# Patient Record
Sex: MALE | Race: BLACK OR AFRICAN AMERICAN | NOT HISPANIC OR LATINO | URBAN - METROPOLITAN AREA
[De-identification: names, ages, dates, MRNs, and addresses within clinical notes are randomized per-mention and may not be internally consistent; named-entity substitution may affect disease eponyms.]

---

## 2019-02-16 ENCOUNTER — INPATIENT (INPATIENT)
Facility: HOSPITAL | Age: 39
LOS: 5 days | Discharge: HOME | End: 2019-02-22
Attending: INTERNAL MEDICINE | Admitting: INTERNAL MEDICINE
Payer: MEDICARE

## 2019-02-16 VITALS
HEART RATE: 109 BPM | RESPIRATION RATE: 20 BRPM | TEMPERATURE: 99 F | SYSTOLIC BLOOD PRESSURE: 162 MMHG | DIASTOLIC BLOOD PRESSURE: 97 MMHG | OXYGEN SATURATION: 93 %

## 2019-02-16 LAB
ABO RH CONFIRMATION: SIGNIFICANT CHANGE UP
ALBUMIN SERPL ELPH-MCNC: 3.9 G/DL — SIGNIFICANT CHANGE UP (ref 3.5–5.2)
ALP SERPL-CCNC: 125 U/L — HIGH (ref 30–115)
ALT FLD-CCNC: 37 U/L — SIGNIFICANT CHANGE UP (ref 0–41)
ANION GAP SERPL CALC-SCNC: 12 MMOL/L — SIGNIFICANT CHANGE UP (ref 7–14)
APAP SERPL-MCNC: <5 UG/ML — LOW (ref 10–30)
APPEARANCE UR: CLEAR — SIGNIFICANT CHANGE UP
APTT BLD: 27.5 SEC — SIGNIFICANT CHANGE UP (ref 27–39.2)
AST SERPL-CCNC: 93 U/L — HIGH (ref 0–41)
BACTERIA # UR AUTO: ABNORMAL /HPF
BASOPHILS # BLD AUTO: 0.11 K/UL — SIGNIFICANT CHANGE UP (ref 0–0.2)
BASOPHILS NFR BLD AUTO: 0.9 % — SIGNIFICANT CHANGE UP (ref 0–1)
BILIRUB DIRECT SERPL-MCNC: 0.5 MG/DL — HIGH (ref 0–0.2)
BILIRUB INDIRECT FLD-MCNC: 1.3 MG/DL — HIGH (ref 0.2–1.2)
BILIRUB SERPL-MCNC: 1.8 MG/DL — HIGH (ref 0.2–1.2)
BILIRUB UR-MCNC: ABNORMAL
BLD GP AB SCN SERPL QL: ABNORMAL
BUN SERPL-MCNC: 33 MG/DL — HIGH (ref 10–20)
CALCIUM SERPL-MCNC: 8.7 MG/DL — SIGNIFICANT CHANGE UP (ref 8.5–10.1)
CHLORIDE SERPL-SCNC: 106 MMOL/L — SIGNIFICANT CHANGE UP (ref 98–110)
CO2 SERPL-SCNC: 19 MMOL/L — SIGNIFICANT CHANGE UP (ref 17–32)
COLOR SPEC: YELLOW — SIGNIFICANT CHANGE UP
CREAT SERPL-MCNC: 3.4 MG/DL — HIGH (ref 0.7–1.5)
DIFF PNL FLD: ABNORMAL
EOSINOPHIL # BLD AUTO: 0.55 K/UL — SIGNIFICANT CHANGE UP (ref 0–0.7)
EOSINOPHIL NFR BLD AUTO: 4.4 % — SIGNIFICANT CHANGE UP (ref 0–8)
EPI CELLS # UR: ABNORMAL /HPF
GLUCOSE SERPL-MCNC: 83 MG/DL — SIGNIFICANT CHANGE UP (ref 70–99)
GLUCOSE UR QL: NEGATIVE MG/DL — SIGNIFICANT CHANGE UP
HCT VFR BLD CALC: 15 % — LOW (ref 42–52)
HGB BLD-MCNC: 5.5 G/DL — CRITICAL LOW (ref 14–18)
INR BLD: 1.18 RATIO — SIGNIFICANT CHANGE UP (ref 0.65–1.3)
KETONES UR-MCNC: NEGATIVE — SIGNIFICANT CHANGE UP
LEUKOCYTE ESTERASE UR-ACNC: NEGATIVE — SIGNIFICANT CHANGE UP
LYMPHOCYTES # BLD AUTO: 3.95 K/UL — HIGH (ref 1.2–3.4)
LYMPHOCYTES # BLD AUTO: 31.3 % — SIGNIFICANT CHANGE UP (ref 20.5–51.1)
MCHC RBC-ENTMCNC: 30.7 PG — SIGNIFICANT CHANGE UP (ref 27–31)
MCHC RBC-ENTMCNC: 36.7 G/DL — SIGNIFICANT CHANGE UP (ref 32–37)
MCV RBC AUTO: 83.8 FL — SIGNIFICANT CHANGE UP (ref 80–94)
MONOCYTES # BLD AUTO: 0.98 K/UL — HIGH (ref 0.1–0.6)
MONOCYTES NFR BLD AUTO: 7.8 % — SIGNIFICANT CHANGE UP (ref 1.7–9.3)
MYELOCYTES NFR BLD: 1.7 % — HIGH (ref 0–0)
NEUTROPHILS # BLD AUTO: 6.68 K/UL — HIGH (ref 1.4–6.5)
NEUTROPHILS NFR BLD AUTO: 53 % — SIGNIFICANT CHANGE UP (ref 42.2–75.2)
NITRITE UR-MCNC: NEGATIVE — SIGNIFICANT CHANGE UP
NRBC # BLD: 14 /100 — HIGH (ref 0–0)
NRBC # BLD: SIGNIFICANT CHANGE UP /100 WBCS (ref 0–0)
PH UR: 6.5 — SIGNIFICANT CHANGE UP (ref 5–8)
PLAT MORPH BLD: NORMAL — SIGNIFICANT CHANGE UP
PLATELET # BLD AUTO: 297 K/UL — SIGNIFICANT CHANGE UP (ref 130–400)
POTASSIUM SERPL-MCNC: 5.2 MMOL/L — HIGH (ref 3.5–5)
POTASSIUM SERPL-SCNC: 5.2 MMOL/L — HIGH (ref 3.5–5)
PROT SERPL-MCNC: 7.9 G/DL — SIGNIFICANT CHANGE UP (ref 6–8)
PROT UR-MCNC: >=300 MG/DL
PROTHROM AB SERPL-ACNC: 13.6 SEC — HIGH (ref 9.95–12.87)
RBC # BLD: 1.67 M/UL — LOW (ref 4.7–6.1)
RBC # BLD: 1.79 M/UL — LOW (ref 4.7–6.1)
RBC # FLD: 23.3 % — HIGH (ref 11.5–14.5)
RBC BLD AUTO: ABNORMAL
RBC CASTS # UR COMP ASSIST: ABNORMAL /HPF
RETICS #: 75 K/UL — SIGNIFICANT CHANGE UP (ref 25–125)
RETICS/RBC NFR: 4.5 % — HIGH (ref 0.5–1.5)
SALICYLATES SERPL-MCNC: <0.3 MG/DL — LOW (ref 4–30)
SODIUM SERPL-SCNC: 137 MMOL/L — SIGNIFICANT CHANGE UP (ref 135–146)
SP GR SPEC: 1.01 — SIGNIFICANT CHANGE UP (ref 1.01–1.03)
TYPE + AB SCN PNL BLD: SIGNIFICANT CHANGE UP
UROBILINOGEN FLD QL: 1 MG/DL (ref 0.2–0.2)
VARIANT LYMPHS # BLD: 0.9 % — SIGNIFICANT CHANGE UP (ref 0–5)
WBC # BLD: 12.61 K/UL — HIGH (ref 4.8–10.8)
WBC # FLD AUTO: 12.61 K/UL — HIGH (ref 4.8–10.8)

## 2019-02-16 RX ORDER — SODIUM CHLORIDE 9 MG/ML
3 INJECTION INTRAMUSCULAR; INTRAVENOUS; SUBCUTANEOUS ONCE
Qty: 0 | Refills: 0 | Status: COMPLETED | OUTPATIENT
Start: 2019-02-16 | End: 2019-02-16

## 2019-02-16 RX ORDER — SODIUM CHLORIDE 9 MG/ML
1000 INJECTION, SOLUTION INTRAVENOUS
Qty: 0 | Refills: 0 | Status: DISCONTINUED | OUTPATIENT
Start: 2019-02-16 | End: 2019-02-17

## 2019-02-16 RX ORDER — ONDANSETRON 8 MG/1
4 TABLET, FILM COATED ORAL ONCE
Qty: 0 | Refills: 0 | Status: COMPLETED | OUTPATIENT
Start: 2019-02-16 | End: 2019-02-16

## 2019-02-16 RX ADMIN — ONDANSETRON 4 MILLIGRAM(S): 8 TABLET, FILM COATED ORAL at 21:35

## 2019-02-16 RX ADMIN — SODIUM CHLORIDE 3 MILLILITER(S): 9 INJECTION INTRAMUSCULAR; INTRAVENOUS; SUBCUTANEOUS at 21:35

## 2019-02-16 RX ADMIN — SODIUM CHLORIDE 100 MILLILITER(S): 9 INJECTION, SOLUTION INTRAVENOUS at 21:35

## 2019-02-16 NOTE — ED PROVIDER NOTE - ATTENDING CONTRIBUTION TO CARE
39 yo m with pmh of sickle cell disease, on hydroxyurea only, says baseline hgb of 7, presents with c/o nausea and "parasites" on skin and stool.  pt says he had been feeling very itchy all over for 2 weeks.  pt has been undomiciled for a few months.  pt downloaded a "magnifier saige" on his phone and slathered vaseline on his skin to "suffocate" the parasites and took a video.  pt then had a bm last night and decided to also take a video of the toilet.  pt says he sees "mealworms".  pt feels fatigued and just feels "off".  exam: nad, ncat, perrl, eomi, mmm, rrr, ctab, abd soft, nt,nd aox3, +pallor, tattoos on skin, unable to appreciate any insects/parasites

## 2019-02-16 NOTE — ED ADULT TRIAGE NOTE - CHIEF COMPLAINT QUOTE
Pt states "I have a parasite and they are crawling out of me." Pt states pt has had +nausea, +bowel movements with 'parasites' for 2 weeks. Pt denies any abdominal pain or fever.

## 2019-02-16 NOTE — ED PROVIDER NOTE - PHYSICAL EXAMINATION
VITAL SIGNS: I have reviewed nursing notes and confirm.  CONSTITUTIONAL: Well-developed; well-nourished; in no acute distress.  SKIN: Skin exam is warm and dry, <2 sec cap refill  HEAD: Normocephalic; atraumatic.  EYES: PERRL, EOM intact; normal conjunctiva.  ENT: MMM; airway clear.   NECK: Supple; non tender.  CARD: RRR, 2+ dp pulses  RESP: No wheezes, rales or rhonchi, speaking in full sentences  ABD: soft non tender.   EXT: Normal ROM. No edema.  NEURO: Alert, oriented. Grossly unremarkable. No focal deficits.  PSYCH: Cooperative, appropriate. flat affect VITAL SIGNS: I have reviewed nursing notes and confirm.  CONSTITUTIONAL: Well-developed; well-nourished; in no acute distress.  SKIN: Skin exam is warm and dry, <2 sec cap refill, no skin lesions, unable to appreciate any paracites at this time  HEAD: Normocephalic; atraumatic.  EYES: PERRL, EOM intact; +pale conjunctiva.  ENT: MMM; airway clear.   NECK: Supple; non tender.  CARD: RRR, 2+ dp pulses  RESP: No wheezes, rales or rhonchi, speaking in full sentences  ABD: soft non tender.   EXT: Normal ROM. No edema.  NEURO: Alert, oriented. Grossly unremarkable. No focal deficits.  PSYCH: Cooperative, appropriate. flat affect

## 2019-02-16 NOTE — ED ADULT NURSE NOTE - NSIMPLEMENTINTERV_GEN_ALL_ED
Implemented All Universal Safety Interventions:  [place] to call system. Call bell, personal items and telephone within reach. Instruct patient to call for assistance. Room bathroom lighting operational. Non-slip footwear when patient is off stretcher. Physically safe environment: no spills, clutter or unnecessary equipment. Stretcher in lowest position, wheels locked, appropriate side rails in place.

## 2019-02-16 NOTE — ED PROVIDER NOTE - OBJECTIVE STATEMENT
37 y/o M, h/o sickle cell disease-hydroxyurea only (last crisis was 2 weeks ago, last transfusion was over a year ago-baseline hgb of 7), currently undomicilied for the pat 3-4m, presents with nausea, vomiting, and "parasites crawling over skin and in bowel movements" for the past 2 weeks. pt states the paracites are microscopic and can only be seen with a magnifyign glass. he noticed yesterday after he downloaded a magnifying saige and applying vaseline to his skin. pt staets he last vomited last night. no known relieving or exacerbating symptoms. denies BRBPR, melena, cp, abd pain, sob, urinary symptoms.

## 2019-02-16 NOTE — ED PROVIDER NOTE - CLINICAL SUMMARY MEDICAL DECISION MAKING FREE TEXT BOX
Pt with SCD with c/o parasites, found to be pale, anemic lower than his baseline of 7, also found to have elevated cr, will admit for matheus and anemia, will start transfusion

## 2019-02-16 NOTE — ED PROVIDER NOTE - NS ED ROS FT
Review of Systems:  CONSTITUTIONAL: no fever  EYES: no photophobia, no blurred vision  ENT: no ear pain, no nasal discharge  RESPIRATORY: no shortness of breath, no cough  CARDIAC: no chest pain, no palpitations  GI: no abd pain, + nausea,   : no dysuria; no hematuria,   MUSCULOSKELETAL: no joint paint  NEUROLOGIC: no headache,   PSYCH: no anxiety, non suicidal, non homicidal, no hallucination, no depression

## 2019-02-16 NOTE — ED PROVIDER NOTE - PROGRESS NOTE DETAILS
received a call from [facility]. pt has a positive screen. will take about 2 horus to get blood no prior bloodwork or notes in chart. will get ultrasound to eval KAMRAN

## 2019-02-17 LAB
ALBUMIN SERPL ELPH-MCNC: 3.6 G/DL — SIGNIFICANT CHANGE UP (ref 3.5–5.2)
ALP SERPL-CCNC: 141 U/L — HIGH (ref 30–115)
ALT FLD-CCNC: 42 U/L — HIGH (ref 0–41)
ANION GAP SERPL CALC-SCNC: 11 MMOL/L — SIGNIFICANT CHANGE UP (ref 7–14)
ANISOCYTOSIS BLD QL: SIGNIFICANT CHANGE UP
AST SERPL-CCNC: 104 U/L — HIGH (ref 0–41)
BASOPHILS # BLD AUTO: 0.08 K/UL — SIGNIFICANT CHANGE UP (ref 0–0.2)
BASOPHILS NFR BLD AUTO: 0.7 % — SIGNIFICANT CHANGE UP (ref 0–1)
BILIRUB SERPL-MCNC: 1.8 MG/DL — HIGH (ref 0.2–1.2)
BUN SERPL-MCNC: 34 MG/DL — HIGH (ref 10–20)
CALCIUM SERPL-MCNC: 8.7 MG/DL — SIGNIFICANT CHANGE UP (ref 8.5–10.1)
CHLORIDE SERPL-SCNC: 107 MMOL/L — SIGNIFICANT CHANGE UP (ref 98–110)
CHLORIDE UR-SCNC: 44 — SIGNIFICANT CHANGE UP
CO2 SERPL-SCNC: 19 MMOL/L — SIGNIFICANT CHANGE UP (ref 17–32)
CREAT ?TM UR-MCNC: 81 MG/DL — SIGNIFICANT CHANGE UP
CREAT SERPL-MCNC: 3 MG/DL — HIGH (ref 0.7–1.5)
ELLIPTOCYTES BLD QL SMEAR: SLIGHT — SIGNIFICANT CHANGE UP
EOSINOPHIL # BLD AUTO: 0.61 K/UL — SIGNIFICANT CHANGE UP (ref 0–0.7)
EOSINOPHIL NFR BLD AUTO: 5.4 % — SIGNIFICANT CHANGE UP (ref 0–8)
GIANT PLATELETS BLD QL SMEAR: PRESENT — SIGNIFICANT CHANGE UP
GLUCOSE SERPL-MCNC: 102 MG/DL — HIGH (ref 70–99)
HCT VFR BLD CALC: 20.4 % — LOW (ref 42–52)
HGB BLD-MCNC: 7.5 G/DL — LOW (ref 14–18)
HYPOCHROMIA BLD QL: SIGNIFICANT CHANGE UP
IMM GRANULOCYTES NFR BLD AUTO: 1.2 % — HIGH (ref 0.1–0.3)
LYMPHOCYTES # BLD AUTO: 1.67 K/UL — SIGNIFICANT CHANGE UP (ref 1.2–3.4)
LYMPHOCYTES # BLD AUTO: 14.9 % — LOW (ref 20.5–51.1)
MACROCYTES BLD QL: SLIGHT — SIGNIFICANT CHANGE UP
MCHC RBC-ENTMCNC: 30.6 PG — SIGNIFICANT CHANGE UP (ref 27–31)
MCHC RBC-ENTMCNC: 36.8 G/DL — SIGNIFICANT CHANGE UP (ref 32–37)
MCV RBC AUTO: 83.3 FL — SIGNIFICANT CHANGE UP (ref 80–94)
MICROCYTES BLD QL: SLIGHT — SIGNIFICANT CHANGE UP
MONOCYTES # BLD AUTO: 1.13 K/UL — HIGH (ref 0.1–0.6)
MONOCYTES NFR BLD AUTO: 10.1 % — HIGH (ref 1.7–9.3)
NEUTROPHILS # BLD AUTO: 7.61 K/UL — HIGH (ref 1.4–6.5)
NEUTROPHILS NFR BLD AUTO: 67.7 % — SIGNIFICANT CHANGE UP (ref 42.2–75.2)
NRBC # BLD: 29 /100 WBCS — HIGH (ref 0–0)
PLATELET # BLD AUTO: 317 K/UL — SIGNIFICANT CHANGE UP (ref 130–400)
POIKILOCYTOSIS BLD QL AUTO: SIGNIFICANT CHANGE UP
POLYCHROMASIA BLD QL SMEAR: SIGNIFICANT CHANGE UP
POTASSIUM SERPL-MCNC: 5.4 MMOL/L — HIGH (ref 3.5–5)
POTASSIUM SERPL-SCNC: 5.4 MMOL/L — HIGH (ref 3.5–5)
POTASSIUM UR-SCNC: 31 MMOL/L — SIGNIFICANT CHANGE UP
PROT ?TM UR-MCNC: 510 MG/DLG/24H — SIGNIFICANT CHANGE UP
PROT SERPL-MCNC: 7.3 G/DL — SIGNIFICANT CHANGE UP (ref 6–8)
RBC # BLD: 2.45 M/UL — LOW (ref 4.7–6.1)
RBC # FLD: 22.3 % — HIGH (ref 11.5–14.5)
SICKLE CELLS BLD QL SMEAR: SLIGHT — SIGNIFICANT CHANGE UP
SODIUM SERPL-SCNC: 137 MMOL/L — SIGNIFICANT CHANGE UP (ref 135–146)
SODIUM UR-SCNC: 52 MMOL/L — SIGNIFICANT CHANGE UP
WBC # BLD: 11.24 K/UL — HIGH (ref 4.8–10.8)
WBC # FLD AUTO: 11.24 K/UL — HIGH (ref 4.8–10.8)

## 2019-02-17 RX ORDER — ACETAMINOPHEN 500 MG
650 TABLET ORAL EVERY 6 HOURS
Qty: 0 | Refills: 0 | Status: DISCONTINUED | OUTPATIENT
Start: 2019-02-17 | End: 2019-02-22

## 2019-02-17 RX ORDER — CHLORHEXIDINE GLUCONATE 213 G/1000ML
1 SOLUTION TOPICAL
Qty: 0 | Refills: 0 | Status: DISCONTINUED | OUTPATIENT
Start: 2019-02-17 | End: 2019-02-22

## 2019-02-17 RX ORDER — MORPHINE SULFATE 50 MG/1
2 CAPSULE, EXTENDED RELEASE ORAL EVERY 6 HOURS
Qty: 0 | Refills: 0 | Status: DISCONTINUED | OUTPATIENT
Start: 2019-02-17 | End: 2019-02-18

## 2019-02-17 RX ORDER — HYDROXYZINE HCL 10 MG
25 TABLET ORAL EVERY 8 HOURS
Qty: 0 | Refills: 0 | Status: DISCONTINUED | OUTPATIENT
Start: 2019-02-17 | End: 2019-02-18

## 2019-02-17 RX ORDER — SODIUM CHLORIDE 9 MG/ML
1000 INJECTION INTRAMUSCULAR; INTRAVENOUS; SUBCUTANEOUS
Qty: 0 | Refills: 0 | Status: DISCONTINUED | OUTPATIENT
Start: 2019-02-17 | End: 2019-02-18

## 2019-02-17 RX ORDER — MORPHINE SULFATE 50 MG/1
2 CAPSULE, EXTENDED RELEASE ORAL ONCE
Qty: 0 | Refills: 0 | Status: DISCONTINUED | OUTPATIENT
Start: 2019-02-17 | End: 2019-02-17

## 2019-02-17 RX ORDER — HEPARIN SODIUM 5000 [USP'U]/ML
5000 INJECTION INTRAVENOUS; SUBCUTANEOUS EVERY 8 HOURS
Qty: 0 | Refills: 0 | Status: DISCONTINUED | OUTPATIENT
Start: 2019-02-17 | End: 2019-02-22

## 2019-02-17 RX ORDER — FOLIC ACID 0.8 MG
1 TABLET ORAL DAILY
Qty: 0 | Refills: 0 | Status: DISCONTINUED | OUTPATIENT
Start: 2019-02-17 | End: 2019-02-22

## 2019-02-17 RX ADMIN — Medication 1 MILLIGRAM(S): at 12:26

## 2019-02-17 RX ADMIN — HEPARIN SODIUM 5000 UNIT(S): 5000 INJECTION INTRAVENOUS; SUBCUTANEOUS at 05:59

## 2019-02-17 RX ADMIN — MORPHINE SULFATE 2 MILLIGRAM(S): 50 CAPSULE, EXTENDED RELEASE ORAL at 18:05

## 2019-02-17 RX ADMIN — HEPARIN SODIUM 5000 UNIT(S): 5000 INJECTION INTRAVENOUS; SUBCUTANEOUS at 13:11

## 2019-02-17 RX ADMIN — MORPHINE SULFATE 2 MILLIGRAM(S): 50 CAPSULE, EXTENDED RELEASE ORAL at 04:07

## 2019-02-17 RX ADMIN — MORPHINE SULFATE 2 MILLIGRAM(S): 50 CAPSULE, EXTENDED RELEASE ORAL at 02:35

## 2019-02-17 RX ADMIN — CHLORHEXIDINE GLUCONATE 1 APPLICATION(S): 213 SOLUTION TOPICAL at 05:59

## 2019-02-17 RX ADMIN — MORPHINE SULFATE 2 MILLIGRAM(S): 50 CAPSULE, EXTENDED RELEASE ORAL at 17:50

## 2019-02-17 RX ADMIN — MORPHINE SULFATE 2 MILLIGRAM(S): 50 CAPSULE, EXTENDED RELEASE ORAL at 10:26

## 2019-02-17 RX ADMIN — MORPHINE SULFATE 2 MILLIGRAM(S): 50 CAPSULE, EXTENDED RELEASE ORAL at 10:40

## 2019-02-17 RX ADMIN — HEPARIN SODIUM 5000 UNIT(S): 5000 INJECTION INTRAVENOUS; SUBCUTANEOUS at 21:22

## 2019-02-17 NOTE — H&P ADULT - HISTORY OF PRESENT ILLNESS
39 yo m with pmh of sickle cell disease, on hydroxyurea only, says baseline hgb of 7, presents with c/o nausea and "parasites" on skin and stool.  pt says he had been feeling very itchy all over for 2 weeks.  pt has been undomiciled for a few months.  pt downloaded a "magnifier saige" on his phone and slathered Vaseline on his skin to "suffocate" the parasites and took a video.  pt then had a bm last night and decided to also take a video of the toilet.  pt says he sees "mealworms".  pt feels fatigued and just feels "off".    In the ED the patient had a Hgb of 5.5, Retic 4.5, T&S obtained, and 2U PRBC ordered  Cr also noted to be 3, unsure what baseline is. 39 yo m with pmh of sickle cell disease, on hydroxyurea only, says baseline hgb of 7, presents with c/o nausea and "parasites" on skin and stool.  pt says he had been feeling very itchy all over for 2 weeks. no pain, paresthesia, numbness. pt has been undomiciled for a few months.  pt downloaded a "magnifier saige" on his phone and slathered Vaseline on his skin to "suffocate" the parasites and took a video.  pt then had a bm last night and decided to also take a video of the toilet.  pt says he sees "mealworms".  pt feels fatigued and just feels "off".    In the ED the patient had a Hgb of 5.5, Retic 4.5, T&S obtained, and 2U PRBC ordered  Cr also noted to be 3, unsure what baseline is.

## 2019-02-17 NOTE — H&P ADULT - ASSESSMENT
38 M w/ sickle cell disease c/o parasites crawling on him, found to have hgb 5.5, being admitted for sickle cell crisis.    # Sickle cell anemia  - transfuse 2u prbc  - f/u cbc  - pain ctrl  - folic acid  - IVF    # Likely KAMRAN on CKD vs CKD  - Renal US  - Urine studies  - c3,c4, bella, gb membrane, ck, hep b/c, neutrophil cytoplasmic ab, if  - consider renal consult    # Sensation of parasites  - utox , drug screen    # DVTppx: Lovenox  CHG  diet  full code  dispo: needs cm/  for placement

## 2019-02-17 NOTE — H&P ADULT - ATTENDING COMMENTS
to me pt denied itchiness but endorses he occasionally sees insects crawling on his skin although he is not very forthcoming in his history  suggests possible psychosis vs. medication effect (he states he is not on any medication except hydruea)  no evidence of scabies  transfuse as above, unclear baseline  to reach out to possible family and pmd for further collateral  matheus vs. ckd noted; renal consult to me pt denied itchiness but endorses he occasionally sees insects crawling on his skin although he is not very forthcoming in his history  suggests possible psychosis vs. medication effect (he states he is not on any medication except hydruea)  drug screen  social work consult  no evidence of scabies  transfuse as above, unclear baseline  to reach out to possible family and pmd for further collateral  matheus vs. ckd noted; renal consult

## 2019-02-17 NOTE — H&P ADULT - NSHPLABSRESULTS_GEN_ALL_CORE
LABS:                        5.5    12.61 )-----------( 297      ( 2019 21:38 )             15.0     -    137  |  106  |  33<H>  ----------------------------<  83  5.2<H>   |  19  |  3.4<H>    Ca    8.7      2019 21:38    TPro  7.9  /  Alb  3.9  /  TBili  1.8<H>  /  DBili  0.5<H>  /  AST  93<H>  /  ALT  37  /  AlkPhos  125<H>  02    PT/INR - ( 2019 21:38 )   PT: 13.60 sec;   INR: 1.18 ratio         PTT - ( 2019 21:38 )  PTT:27.5 sec  Urinalysis Basic - ( 2019 22:24 )    Color: Yellow / Appearance: Clear / S.015 / pH: x  Gluc: x / Ketone: Negative  / Bili: Small / Urobili: 1.0 mg/dL   Blood: x / Protein: >=300 mg/dL / Nitrite: Negative   Leuk Esterase: Negative / RBC: 3-5 /HPF / WBC x   Sq Epi: x / Non Sq Epi: Few /HPF / Bacteria: Few /HPF    RADIOLOGY:

## 2019-02-17 NOTE — H&P ADULT - NSHPPHYSICALEXAM_GEN_ALL_CORE
VITALS:   T(F): 99.1  HR: 94  BP: 148/86  RR: 20  SpO2: 93%    PHYSICAL EXAM:  GEN: No acute distress  LUNGS: Clear to auscultation bilaterally   HEART: S1/S2 present. RRR.   ABD: Soft, non-tender, non-distended. Bowel sounds present  EXT: NC/NC/NE/GARCIA  NEURO: AAOX3

## 2019-02-18 LAB
ALBUMIN SERPL ELPH-MCNC: 3.3 G/DL — LOW (ref 3.5–5.2)
ALP SERPL-CCNC: 153 U/L — HIGH (ref 30–115)
ALT FLD-CCNC: 47 U/L — HIGH (ref 0–41)
ANION GAP SERPL CALC-SCNC: 11 MMOL/L — SIGNIFICANT CHANGE UP (ref 7–14)
AST SERPL-CCNC: 103 U/L — HIGH (ref 0–41)
BASOPHILS # BLD AUTO: 0.07 K/UL — SIGNIFICANT CHANGE UP (ref 0–0.2)
BASOPHILS NFR BLD AUTO: 0.6 % — SIGNIFICANT CHANGE UP (ref 0–1)
BILIRUB SERPL-MCNC: 2.6 MG/DL — HIGH (ref 0.2–1.2)
BUN SERPL-MCNC: 41 MG/DL — HIGH (ref 10–20)
CALCIUM SERPL-MCNC: 8.5 MG/DL — SIGNIFICANT CHANGE UP (ref 8.5–10.1)
CHLORIDE SERPL-SCNC: 107 MMOL/L — SIGNIFICANT CHANGE UP (ref 98–110)
CK MB CFR SERPL CALC: 1.3 NG/ML — SIGNIFICANT CHANGE UP (ref 0.6–6.3)
CK SERPL-CCNC: 54 U/L — SIGNIFICANT CHANGE UP (ref 0–225)
CO2 SERPL-SCNC: 17 MMOL/L — SIGNIFICANT CHANGE UP (ref 17–32)
CREAT SERPL-MCNC: 2.8 MG/DL — HIGH (ref 0.7–1.5)
EOSINOPHIL # BLD AUTO: 0.65 K/UL — SIGNIFICANT CHANGE UP (ref 0–0.7)
EOSINOPHIL NFR BLD AUTO: 5.9 % — SIGNIFICANT CHANGE UP (ref 0–8)
GLUCOSE SERPL-MCNC: 102 MG/DL — HIGH (ref 70–99)
HCT VFR BLD CALC: 19.6 % — LOW (ref 42–52)
HGB BLD-MCNC: 7.2 G/DL — CRITICAL LOW (ref 14–18)
IMM GRANULOCYTES NFR BLD AUTO: 1.2 % — HIGH (ref 0.1–0.3)
LDH SERPL L TO P-CCNC: >1000 U/L — HIGH (ref 50–242)
LYMPHOCYTES # BLD AUTO: 18.7 % — LOW (ref 20.5–51.1)
LYMPHOCYTES # BLD AUTO: 2.06 K/UL — SIGNIFICANT CHANGE UP (ref 1.2–3.4)
MCHC RBC-ENTMCNC: 31.3 PG — HIGH (ref 27–31)
MCHC RBC-ENTMCNC: 36.7 G/DL — SIGNIFICANT CHANGE UP (ref 32–37)
MCV RBC AUTO: 85.2 FL — SIGNIFICANT CHANGE UP (ref 80–94)
MONOCYTES # BLD AUTO: 1.2 K/UL — HIGH (ref 0.1–0.6)
MONOCYTES NFR BLD AUTO: 10.9 % — HIGH (ref 1.7–9.3)
NEUTROPHILS # BLD AUTO: 6.93 K/UL — HIGH (ref 1.4–6.5)
NEUTROPHILS NFR BLD AUTO: 62.7 % — SIGNIFICANT CHANGE UP (ref 42.2–75.2)
NRBC # BLD: 29 /100 WBCS — HIGH (ref 0–0)
PLATELET # BLD AUTO: 307 K/UL — SIGNIFICANT CHANGE UP (ref 130–400)
POTASSIUM SERPL-MCNC: 4.8 MMOL/L — SIGNIFICANT CHANGE UP (ref 3.5–5)
POTASSIUM SERPL-SCNC: 4.8 MMOL/L — SIGNIFICANT CHANGE UP (ref 3.5–5)
PROT SERPL-MCNC: 6.9 G/DL — SIGNIFICANT CHANGE UP (ref 6–8)
RBC # BLD: 2.3 M/UL — LOW (ref 4.7–6.1)
RBC # BLD: 2.35 M/UL — LOW (ref 4.7–6.1)
RBC # FLD: 23 % — HIGH (ref 11.5–14.5)
RETICS #: 463.4 K/UL — HIGH (ref 25–125)
RETICS/RBC NFR: 19.7 % — HIGH (ref 0.5–1.5)
SODIUM SERPL-SCNC: 135 MMOL/L — SIGNIFICANT CHANGE UP (ref 135–146)
TROPONIN T SERPL-MCNC: 0.02 NG/ML — HIGH
WBC # BLD: 11.04 K/UL — HIGH (ref 4.8–10.8)
WBC # FLD AUTO: 11.04 K/UL — HIGH (ref 4.8–10.8)

## 2019-02-18 PROCEDURE — 93306 TTE W/DOPPLER COMPLETE: CPT | Mod: 26

## 2019-02-18 RX ORDER — DIPHENHYDRAMINE HCL 50 MG
50 CAPSULE ORAL EVERY 6 HOURS
Qty: 0 | Refills: 0 | Status: DISCONTINUED | OUTPATIENT
Start: 2019-02-18 | End: 2019-02-22

## 2019-02-18 RX ORDER — AZITHROMYCIN 500 MG/1
500 TABLET, FILM COATED ORAL EVERY 24 HOURS
Qty: 0 | Refills: 0 | Status: DISCONTINUED | OUTPATIENT
Start: 2019-02-19 | End: 2019-02-22

## 2019-02-18 RX ORDER — DIPHENHYDRAMINE HCL 50 MG
50 CAPSULE ORAL EVERY 4 HOURS
Qty: 0 | Refills: 0 | Status: DISCONTINUED | OUTPATIENT
Start: 2019-02-18 | End: 2019-02-18

## 2019-02-18 RX ORDER — CEFTRIAXONE 500 MG/1
1 INJECTION, POWDER, FOR SOLUTION INTRAMUSCULAR; INTRAVENOUS EVERY 24 HOURS
Qty: 0 | Refills: 0 | Status: DISCONTINUED | OUTPATIENT
Start: 2019-02-18 | End: 2019-02-22

## 2019-02-18 RX ORDER — AZITHROMYCIN 500 MG/1
TABLET, FILM COATED ORAL
Qty: 0 | Refills: 0 | Status: DISCONTINUED | OUTPATIENT
Start: 2019-02-18 | End: 2019-02-22

## 2019-02-18 RX ORDER — HYDROMORPHONE HYDROCHLORIDE 2 MG/ML
2 INJECTION INTRAMUSCULAR; INTRAVENOUS; SUBCUTANEOUS EVERY 6 HOURS
Qty: 0 | Refills: 0 | Status: DISCONTINUED | OUTPATIENT
Start: 2019-02-18 | End: 2019-02-19

## 2019-02-18 RX ORDER — MORPHINE SULFATE 50 MG/1
2 CAPSULE, EXTENDED RELEASE ORAL ONCE
Qty: 0 | Refills: 0 | Status: DISCONTINUED | OUTPATIENT
Start: 2019-02-18 | End: 2019-02-18

## 2019-02-18 RX ORDER — SODIUM CHLORIDE 9 MG/ML
1000 INJECTION INTRAMUSCULAR; INTRAVENOUS; SUBCUTANEOUS
Qty: 0 | Refills: 0 | Status: DISCONTINUED | OUTPATIENT
Start: 2019-02-18 | End: 2019-02-19

## 2019-02-18 RX ORDER — AZITHROMYCIN 500 MG/1
500 TABLET, FILM COATED ORAL ONCE
Qty: 0 | Refills: 0 | Status: COMPLETED | OUTPATIENT
Start: 2019-02-18 | End: 2019-02-18

## 2019-02-18 RX ADMIN — HEPARIN SODIUM 5000 UNIT(S): 5000 INJECTION INTRAVENOUS; SUBCUTANEOUS at 05:19

## 2019-02-18 RX ADMIN — Medication 1 MILLIGRAM(S): at 11:57

## 2019-02-18 RX ADMIN — CEFTRIAXONE 100 GRAM(S): 500 INJECTION, POWDER, FOR SOLUTION INTRAMUSCULAR; INTRAVENOUS at 14:43

## 2019-02-18 RX ADMIN — HEPARIN SODIUM 5000 UNIT(S): 5000 INJECTION INTRAVENOUS; SUBCUTANEOUS at 14:40

## 2019-02-18 RX ADMIN — HYDROMORPHONE HYDROCHLORIDE 2 MILLIGRAM(S): 2 INJECTION INTRAMUSCULAR; INTRAVENOUS; SUBCUTANEOUS at 18:04

## 2019-02-18 RX ADMIN — HYDROMORPHONE HYDROCHLORIDE 2 MILLIGRAM(S): 2 INJECTION INTRAMUSCULAR; INTRAVENOUS; SUBCUTANEOUS at 17:49

## 2019-02-18 RX ADMIN — HEPARIN SODIUM 5000 UNIT(S): 5000 INJECTION INTRAVENOUS; SUBCUTANEOUS at 21:26

## 2019-02-18 RX ADMIN — Medication 25 MILLIGRAM(S): at 04:18

## 2019-02-18 RX ADMIN — CHLORHEXIDINE GLUCONATE 1 APPLICATION(S): 213 SOLUTION TOPICAL at 05:20

## 2019-02-18 RX ADMIN — MORPHINE SULFATE 2 MILLIGRAM(S): 50 CAPSULE, EXTENDED RELEASE ORAL at 23:55

## 2019-02-18 RX ADMIN — AZITHROMYCIN 255 MILLIGRAM(S): 500 TABLET, FILM COATED ORAL at 14:40

## 2019-02-18 RX ADMIN — MORPHINE SULFATE 2 MILLIGRAM(S): 50 CAPSULE, EXTENDED RELEASE ORAL at 03:44

## 2019-02-18 RX ADMIN — Medication 50 MILLIGRAM(S): at 12:14

## 2019-02-18 RX ADMIN — SODIUM CHLORIDE 75 MILLILITER(S): 9 INJECTION INTRAMUSCULAR; INTRAVENOUS; SUBCUTANEOUS at 03:00

## 2019-02-18 RX ADMIN — Medication 50 MILLIGRAM(S): at 17:49

## 2019-02-18 RX ADMIN — HYDROMORPHONE HYDROCHLORIDE 2 MILLIGRAM(S): 2 INJECTION INTRAMUSCULAR; INTRAVENOUS; SUBCUTANEOUS at 12:25

## 2019-02-18 RX ADMIN — HYDROMORPHONE HYDROCHLORIDE 2 MILLIGRAM(S): 2 INJECTION INTRAMUSCULAR; INTRAVENOUS; SUBCUTANEOUS at 12:14

## 2019-02-18 RX ADMIN — MORPHINE SULFATE 2 MILLIGRAM(S): 50 CAPSULE, EXTENDED RELEASE ORAL at 22:41

## 2019-02-18 NOTE — CONSULT NOTE ADULT - SUBJECTIVE AND OBJECTIVE BOX
Patient is a 38y old  Male who presents with a chief complaint of SCD crises (18 Feb 2019 11:27)      HPI:  39 yo m with pmh of sickle cell disease, on hydroxyurea only, says baseline hgb of 7, presents with c/o nausea and "parasites" on skin and stool.  pt says he had been feeling very itchy all over for 2 weeks. no pain, paresthesia, numbness. pt has been undomiciled for a few months.  pt downloaded a "magnifier saige" on his phone and slathered Vaseline on his skin to "suffocate" the parasites and took a video.  pt then had a bm last night and decided to also take a video of the toilet.  pt says he sees "mealworms".  pt feels fatigued and just feels "off".    In the ED the patient had a Hgb of 5.5, Retic 4.5, T&S obtained, and 2U PRBC ordered  Cr also noted to be 3, unsure what baseline is. (17 Feb 2019 03:13)    Hematology HPI: Patient states that he used to follow up with a hematologist a long time ago, but he does not remember who it was.  He has never had an exchange transfusion.       ROS:  Negative except for:    PAST MEDICAL & SURGICAL HISTORY:  Sickle cell anemia      SOCIAL HISTORY:    FAMILY HISTORY:      MEDICATIONS  (STANDING):  azithromycin  IVPB      cefTRIAXone   IVPB 1 Gram(s) IV Intermittent every 24 hours  chlorhexidine 4% Liquid 1 Application(s) Topical <User Schedule>  diphenhydrAMINE   Injectable 50 milliGRAM(s) IV Push every 6 hours  folic acid 1 milliGRAM(s) Oral daily  heparin  Injectable 5000 Unit(s) SubCutaneous every 8 hours  HYDROmorphone  Injectable 2 milliGRAM(s) IV Push every 6 hours  sodium chloride 0.9%. 1000 milliLiter(s) (100 mL/Hr) IV Continuous <Continuous>    MEDICATIONS  (PRN):  acetaminophen   Tablet .. 650 milliGRAM(s) Oral every 6 hours PRN Mild Pain (1 - 3)      Allergies    No Known Allergies    Intolerances        Vital Signs Last 24 Hrs  T(C): 37.1 (18 Feb 2019 14:13), Max: 37.1 (18 Feb 2019 14:13)  T(F): 98.7 (18 Feb 2019 14:13), Max: 98.7 (18 Feb 2019 14:13)  HR: 99 (18 Feb 2019 14:13) (99 - 103)  BP: 162/96 (18 Feb 2019 14:13) (158/92 - 175/118)  BP(mean): --  RR: 18 (18 Feb 2019 14:13) (18 - 19)  SpO2: 96% (18 Feb 2019 11:48) (87% - 96%)    PHYSICAL EXAM  General: adult in NAD  HEENT: clear oropharynx, anicteric sclera, pink conjunctiva  Neck: supple  CV: normal S1/S2 with no murmur rubs or gallops  Lungs: positive air movement b/l ant lungs,clear to auscultation, no wheezes, no rales  Abdomen: soft non-tender non-distended, no hepatosplenomegaly  Ext: no clubbing cyanosis or edema  Skin: no rashes and no petechiae  Neuro: alert and oriented X 4, no focal deficits      LABS:                          7.5    11.24 )-----------( 317      ( 17 Feb 2019 19:12 )             20.4         Mean Cell Volume : 83.3 fL  Mean Cell Hemoglobin : 30.6 pg  Mean Cell Hemoglobin Concentration : 36.8 g/dL  Auto Neutrophil # : 7.61 K/uL  Auto Lymphocyte # : 1.67 K/uL  Auto Monocyte # : 1.13 K/uL  Auto Eosinophil # : 0.61 K/uL  Auto Basophil # : 0.08 K/uL  Auto Neutrophil % : 67.7 %  Auto Lymphocyte % : 14.9 %  Auto Monocyte % : 10.1 %  Auto Eosinophil % : 5.4 %  Auto Basophil % : 0.7 %      Serial CBC's  02-17 @ 19:12  Hct-20.4 / Hgb-7.5 / Plat-317 / RBC-2.45 / WBC-11.24  Serial CBC's  02-16 @ 22:24  Hct--- / Hgb--- / Plat--- / RBC-1.67 / WBC---  Serial CBC's  02-16 @ 21:38  Hct-15.0 / Hgb-5.5 / Plat-297 / RBC-1.79 / WBC-12.61      02-17    137  |  107  |  34<H>  ----------------------------<  102<H>  5.4<H>   |  19  |  3.0<H>    Ca    8.7      17 Feb 2019 19:12    TPro  7.3  /  Alb  3.6  /  TBili  1.8<H>  /  DBili  x   /  AST  104<H>  /  ALT  42<H>  /  AlkPhos  141<H>  02-17      PT/INR - ( 16 Feb 2019 21:38 )   PT: 13.60 sec;   INR: 1.18 ratio         PTT - ( 16 Feb 2019 21:38 )  PTT:27.5 sec    Reticulocyte Percent: 4.5 % (02-16 @ 22:24) Patient is a 38y old  Male who presents with a chief complaint of SCD crises (18 Feb 2019 11:27)      HPI:  37 yo m with pmh of sickle cell disease, on hydroxyurea only, says baseline hgb of 7, presents with c/o nausea and "parasites" on skin and stool.  pt says he had been feeling very itchy all over for 2 weeks. no pain, paresthesia, numbness. pt has been undomiciled for a few months.  pt downloaded a "magnifier saige" on his phone and slathered Vaseline on his skin to "suffocate" the parasites and took a video.  pt then had a bm last night and decided to also take a video of the toilet.  pt says he sees "mealworms".  pt feels fatigued and just feels "off".    In the ED the patient had a Hgb of 5.5, Retic 4.5, T&S obtained, and 2U PRBC ordered  Cr also noted to be 3, unsure what baseline is. (17 Feb 2019 03:13)    Hematology HPI: Patient states that he used to follow up with a hematologist a long time ago, but he does not remember who it was.  He has never had an exchange transfusion.  Admission CXR showed diffuse B/L airspace opacities.  CXR today showed improving opacities.       ROS:  Negative except for: Fatigue.  Patient denies chest pain, shortness of breath.    PAST MEDICAL & SURGICAL HISTORY:  Sickle cell anemia      SOCIAL HISTORY: Patient is homeless.    FAMILY HISTORY: Negative      MEDICATIONS  (STANDING):  azithromycin  IVPB      cefTRIAXone   IVPB 1 Gram(s) IV Intermittent every 24 hours  chlorhexidine 4% Liquid 1 Application(s) Topical <User Schedule>  diphenhydrAMINE   Injectable 50 milliGRAM(s) IV Push every 6 hours  folic acid 1 milliGRAM(s) Oral daily  heparin  Injectable 5000 Unit(s) SubCutaneous every 8 hours  HYDROmorphone  Injectable 2 milliGRAM(s) IV Push every 6 hours  sodium chloride 0.9%. 1000 milliLiter(s) (100 mL/Hr) IV Continuous <Continuous>    MEDICATIONS  (PRN):  acetaminophen   Tablet .. 650 milliGRAM(s) Oral every 6 hours PRN Mild Pain (1 - 3)      Allergies    No Known Allergies    Intolerances        Vital Signs Last 24 Hrs  T(C): 37.1 (18 Feb 2019 14:13), Max: 37.1 (18 Feb 2019 14:13)  T(F): 98.7 (18 Feb 2019 14:13), Max: 98.7 (18 Feb 2019 14:13)  HR: 99 (18 Feb 2019 14:13) (99 - 103)  BP: 162/96 (18 Feb 2019 14:13) (158/92 - 175/118)  BP(mean): --  RR: 18 (18 Feb 2019 14:13) (18 - 19)  SpO2: 96% (18 Feb 2019 11:48) (87% - 96%)    Saturating 90% on RA at time of assessment.    PHYSICAL EXAM  General: thin adult resting comfortably in bed, appears tired but NAD  HEENT: NC/AT  Neck: supple  CV: +S1, +S2  Lungs: poor inspiratory effort, no wheezing  Ext: no edema  Skin: no rashes and no petechiae  Neuro: alert and oriented X 4, no focal deficits.      LABS:                          7.5    11.24 )-----------( 317      ( 17 Feb 2019 19:12 )             20.4         Mean Cell Volume : 83.3 fL  Mean Cell Hemoglobin : 30.6 pg  Mean Cell Hemoglobin Concentration : 36.8 g/dL  Auto Neutrophil # : 7.61 K/uL  Auto Lymphocyte # : 1.67 K/uL  Auto Monocyte # : 1.13 K/uL  Auto Eosinophil # : 0.61 K/uL  Auto Basophil # : 0.08 K/uL  Auto Neutrophil % : 67.7 %  Auto Lymphocyte % : 14.9 %  Auto Monocyte % : 10.1 %  Auto Eosinophil % : 5.4 %  Auto Basophil % : 0.7 %      Serial CBC's  02-17 @ 19:12  Hct-20.4 / Hgb-7.5 / Plat-317 / RBC-2.45 / WBC-11.24  Serial CBC's  02-16 @ 22:24  Hct--- / Hgb--- / Plat--- / RBC-1.67 / WBC---  Serial CBC's  02-16 @ 21:38  Hct-15.0 / Hgb-5.5 / Plat-297 / RBC-1.79 / WBC-12.61      02-17    137  |  107  |  34<H>  ----------------------------<  102<H>  5.4<H>   |  19  |  3.0<H>    Ca    8.7      17 Feb 2019 19:12    TPro  7.3  /  Alb  3.6  /  TBili  1.8<H>  /  DBili  x   /  AST  104<H>  /  ALT  42<H>  /  AlkPhos  141<H>  02-17      PT/INR - ( 16 Feb 2019 21:38 )   PT: 13.60 sec;   INR: 1.18 ratio         PTT - ( 16 Feb 2019 21:38 )  PTT:27.5 sec    Reticulocyte Percent: 4.5 % (02-16 @ 22:24)

## 2019-02-18 NOTE — CONSULT NOTE ADULT - SUBJECTIVE AND OBJECTIVE BOX
NEPHROLOGY CONSULTATION NOTE    Patient is a 38y Male with a pmh whom presented to the hospital with complaints of seeing parasites on his skin and itching. The patient says he used a magnifier glass to see the parasites. He denied any chest pain, sob, abdominal pain at the time. Patient was seen and examined this morning. He states that he still sees the parasites. Patient was asleep in bed. Upon wakening patient said he had sob and complained of feeling bloated. Patient says he has been having trouble urinating. He denied chest pain.     PAST MEDICAL & SURGICAL HISTORY:  Sickle cell anemia    Allergies:  No Known Allergies    Home Medications Reviewed  Hospital Medications:   MEDICATIONS  (STANDING):  chlorhexidine 4% Liquid 1 Application(s) Topical <User Schedule>  folic acid 1 milliGRAM(s) Oral daily  heparin  Injectable 5000 Unit(s) SubCutaneous every 8 hours  sodium chloride 0.9%. 1000 milliLiter(s) (75 mL/Hr) IV Continuous <Continuous>      SOCIAL HISTORY:  Denies ETOH,Smoking,   FAMILY HISTORY:        REVIEW OF SYSTEMS:  CONSTITUTIONAL: No weakness, fevers or chills  RESPIRATORY:  shortness of breath  CARDIOVASCULAR: No chest pain or palpitations.  GASTROINTESTINAL: bloating  GENITOURINARY: No dysuria, frequency, foamy urine, urinary urgency, incontinence or hematuria  NEUROLOGICAL: No numbness or weakness  SKIN: No itching, burning, rashes, or lesions   VASCULAR: No bilateral lower extremity edema.   All other review of systems is negative unless indicated above.    VITALS:  T(F): 97.8 (19 @ 05:00), Max: 98 (19 @ 13:48)  HR: 100 (19 @ 06:00)  BP: 158/92 (19 @ 06:00)  RR: 19 (19 @ 05:00)  SpO2: 95% (19 @ 13:48)     @ 07:01  -   @ 07:00  --------------------------------------------------------  IN: 975 mL / OUT: 1620 mL / NET: -645 mL            I&O's Detail    2019 07:01  -  2019 07:00  --------------------------------------------------------  IN:    sodium chloride 0.9%.: 975 mL  Total IN: 975 mL    OUT:    Voided: 1620 mL  Total OUT: 1620 mL    Total NET: -645 mL            PHYSICAL EXAM:  Constitutional: NAD  HEENT: anicteric sclera, oropharynx clear, MMM  Neck: No JVD  Respiratory: CTAB, no wheezes, rales or rhonchi  Cardiovascular: S1, S2, RRR  Gastrointestinal: BS+, soft,nd, epigastric ttp  Extremities: No cyanosis or clubbing. No peripheral edema  Neurological: A/O x 3, no focal deficits  : No CVA tenderness. No carreon.   Skin: No rashes  Vascular Access:    LABS:      137  |  107  |  34<H>  ----------------------------<  102<H>  5.4<H>   |  19  |  3.0<H>    Ca    8.7      2019 19:12    TPro  7.3  /  Alb  3.6  /  TBili  1.8<H>  /  DBili      /  AST  104<H>  /  ALT  42<H>  /  AlkPhos  141<H>      Creatinine Trend: 3.0 <--, 3.4 <--                        7.5    11.24 )-----------( 317      ( 2019 19:12 )             20.4     Urine Studies:  Urinalysis Basic - ( 2019 22:24 )    Color: Yellow / Appearance: Clear / S.015 / pH:   Gluc:  / Ketone: Negative  / Bili: Small / Urobili: 1.0 mg/dL   Blood:  / Protein: >=300 mg/dL / Nitrite: Negative   Leuk Esterase: Negative / RBC: 3-5 /HPF / WBC    Sq Epi:  / Non Sq Epi: Few /HPF / Bacteria: Few /HPF      Sodium, Random Urine: 52.0 mmoL/L ( @ 10:30)  Chloride, Random Urine: 44 ( @ 10:30)  Potassium, Random Urine: 31 mmol/L ( @ 10:30)  Creatinine, Random Urine: 81 mg/dL ( @ 10:30)            RADIOLOGY & ADDITIONAL STUDIES:  < from: US Retroperitoneal Complete (19 @ 11:52) >    IMPRESSION:    Increased echogenicity of the bilateral kidneys compatible with medical   renal disease.      < end of copied text >

## 2019-02-18 NOTE — CONSULT NOTE ADULT - ATTENDING COMMENTS
Seen with student and fellow. Hydrate patient. check for rhabdo and hemolysis. optimum pain control. monitor electrolytes, Ca, K, Ph. check CK.

## 2019-02-18 NOTE — PROGRESS NOTE ADULT - ASSESSMENT
KARLA MONROY 38y Male  MRN#: [id number]   CODE STATUS:________      SUBJECTIVE  Patient is a 38y old Male who presents with a chief complaint of "parasites on skin, itching" (2019 10:19)  Currently admitted to medicine with the primary diagnosis of Sickle cell anemia  Hospital course has been complicated by _______.   Today is hospital day 1d, and this morning he is _________ and reports ________ overnight events.     Present Today:           [place]anton Catheter ()No/ ()Yes? Indication:          Central Line ()No/ ()Yes? Indication:          IV Fluids ()No/ ()Yes? Type:  Rate:  Indication:      OBJECTIVE  PAST MEDICAL & SURGICAL HISTORY  Sickle cell anemia    ALLERGIES:  No Known Allergies    MEDICATIONS:  STANDING MEDICATIONS  chlorhexidine 4% Liquid 1 Application(s) Topical <User Schedule>  folic acid 1 milliGRAM(s) Oral daily  heparin  Injectable 5000 Unit(s) SubCutaneous every 8 hours  sodium chloride 0.9%. 1000 milliLiter(s) IV Continuous <Continuous>    PRN MEDICATIONS  acetaminophen   Tablet .. 650 milliGRAM(s) Oral every 6 hours PRN  hydrOXYzine hydrochloride 25 milliGRAM(s) Oral every 8 hours PRN  morphine  - Injectable 2 milliGRAM(s) IV Push every 6 hours PRN      VITAL SIGNS: Last 24 Hours  T(C): 36.6 (2019 05:00), Max: 36.7 (2019 13:48)  T(F): 97.8 (2019 05:00), Max: 98 (2019 13:48)  HR: 100 (2019 06:00) (96 - 103)  BP: 158/92 (2019 06:00) (155/95 - 175/118)  BP(mean): --  RR: 19 (2019 05:00) (18 - 19)  SpO2: 87% (2019 11:00) (87% - 95%)    LABS:                        7.5    11.24 )-----------( 317      ( 2019 19:12 )             20.4     02-17    137  |  107  |  34<H>  ----------------------------<  102<H>  5.4<H>   |  19  |  3.0<H>    Ca    8.7      2019 19:12    TPro  7.3  /  Alb  3.6  /  TBili  1.8<H>  /  DBili  x   /  AST  104<H>  /  ALT  42<H>  /  AlkPhos  141<H>  02-17    PT/INR - ( 2019 21:38 )   PT: 13.60 sec;   INR: 1.18 ratio         PTT - ( 2019 21:38 )  PTT:27.5 sec  Urinalysis Basic - ( 2019 22:24 )    Color: Yellow / Appearance: Clear / S.015 / pH: x  Gluc: x / Ketone: Negative  / Bili: Small / Urobili: 1.0 mg/dL   Blood: x / Protein: >=300 mg/dL / Nitrite: Negative   Leuk Esterase: Negative / RBC: 3-5 /HPF / WBC x   Sq Epi: x / Non Sq Epi: Few /HPF / Bacteria: Few /HPF    PHYSICAL EXAM:    GENERAL: AAOx3, looks in distress   HEENT:  Atraumatic, Normocephalic. EOMI, PERRLA, conjunctiva and sclera clear, No JVD  PULMONARY: Clear to auscultation bilaterally; No wheeze  CARDIOVASCULAR: Regular rate and rhythm;   GASTROINTESTINAL: Soft, Nontender, Nondistended;  MUSCULOSKELETAL:  2+ Peripheral Pulses, No clubbing, cyanosis, or edema  NEUROLOGY: non-focal  SKIN: No rashes or lesions    ASSESSMENT & PLAN  38 M w/ sickle cell disease c/o parasites crawling on him, found to have hgb 5.5, being admitted for sickle cell crisis.     # Sickle cell crises/ moderate ACS/ tansaminities and KAMRAN?  - Hbg 5.5 on admission, O2 sat is 87 % on room air, complains of chest tightness, bilateral opacity in CXR.  - v/q scan to r/o PE  - CK to r/o rhabdomyolysis   - C/W IV hydration, NS @ 75,  - s/p 2u prbc, retic 4.%, follow up hemolysis panel  - Hydromorphone 2 q4 for pain  - c/w folic acid  - Incentive spirometry, and DVT ppx   - Hematology consult     # Likely KARMAN on CKD vs CKD   - Renal US  - Urine studies  - c3,c4, bella, gb membrane, ck, hep b/c, neutrophil cytoplasmic ab, if  - nephro on board     # Sensation of parasites  - resolved today   - utox , drug screen  - will consider Psych consult     # DVTppx: Lovenox  # dispo: needs cm/  for placement KARLA MONROY 38y Male  MRN#: [id number]   CODE STATUS:FULLCODE      SUBJECTIVE  Patient is a 38y old Male who presents with a chief complaint of "parasites on skin, itching" (2019 10:19)  Currently admitted to medicine with the primary diagnosis of Sickle cell crises   Today is hospital day 1d, and this morning he is complaining of bone pain, chest tightness, pain is not improving with current medications.     OBJECTIVE  PAST MEDICAL & SURGICAL HISTORY  Sickle cell anemia    ALLERGIES:  No Known Allergies    MEDICATIONS:  STANDING MEDICATIONS  chlorhexidine 4% Liquid 1 Application(s) Topical <User Schedule>  folic acid 1 milliGRAM(s) Oral daily  heparin  Injectable 5000 Unit(s) SubCutaneous every 8 hours  sodium chloride 0.9%. 1000 milliLiter(s) IV Continuous <Continuous>    PRN MEDICATIONS  acetaminophen   Tablet .. 650 milliGRAM(s) Oral every 6 hours PRN  hydrOXYzine hydrochloride 25 milliGRAM(s) Oral every 8 hours PRN  morphine  - Injectable 2 milliGRAM(s) IV Push every 6 hours PRN      VITAL SIGNS: Last 24 Hours  T(C): 36.6 (2019 05:00), Max: 36.7 (2019 13:48)  T(F): 97.8 (2019 05:00), Max: 98 (2019 13:48)  HR: 100 (2019 06:00) (96 - 103)  BP: 158/92 (2019 06:00) (155/95 - 175/118)  BP(mean): --  RR: 19 (2019 05:00) (18 - 19)  SpO2: 87% (2019 11:00) (87% - 95%)    LABS:                        7.5    11.24 )-----------( 317      ( 2019 19:12 )             20.4     02-17    137  |  107  |  34<H>  ----------------------------<  102<H>  5.4<H>   |  19  |  3.0<H>    Ca    8.7      2019 19:12    TPro  7.3  /  Alb  3.6  /  TBili  1.8<H>  /  DBili  x   /  AST  104<H>  /  ALT  42<H>  /  AlkPhos  141<H>  02-17    PT/INR - ( 2019 21:38 )   PT: 13.60 sec;   INR: 1.18 ratio         PTT - ( 2019 21:38 )  PTT:27.5 sec  Urinalysis Basic - ( 2019 22:24 )    Color: Yellow / Appearance: Clear / S.015 / pH: x  Gluc: x / Ketone: Negative  / Bili: Small / Urobili: 1.0 mg/dL   Blood: x / Protein: >=300 mg/dL / Nitrite: Negative   Leuk Esterase: Negative / RBC: 3-5 /HPF / WBC x   Sq Epi: x / Non Sq Epi: Few /HPF / Bacteria: Few /HPF    PHYSICAL EXAM:    GENERAL: AAOx3, looks in distress   HEENT:  Atraumatic, Normocephalic. EOMI, PERRLA, conjunctiva and sclera clear, No JVD  PULMONARY: Clear to auscultation bilaterally; No wheeze  CARDIOVASCULAR: Regular rate and rhythm;   GASTROINTESTINAL: Soft, Nontender, Nondistended;  MUSCULOSKELETAL:  2+ Peripheral Pulses, No clubbing, cyanosis, or edema  NEUROLOGY: non-focal  SKIN: No rashes or lesions    ASSESSMENT & PLAN  38 M w/ sickle cell disease c/o parasites crawling on him, found to have hgb 5.5, being admitted for sickle cell crisis.     # Sickle cell crises/ moderate ACS/ tansaminities and KAMRAN?  - Hbg 5.5 on admission, O2 sat is 87 % on room air, complains of chest tightness, bilateral opacity in CXR.  - v/q scan to r/o PE, EKG and CE to rule out ACS.  - CK to r/o rhabdomyolysis   - C/W IV hydration, NS @ 100,  - start ceftaxime and azithromycin   - s/p 2u prbc, retic 4.%, follow up hemolysis panel  - Hydromorphone 2 q4 for pain  - c/w folic acid  - Incentive spirometry, and DVT ppx   - Hematology consult     # Likely KAMRAN on CKD vs CKD   - proteuria, no urine RBC with positive dipstick due to hemolysis, r/o rhabdo   - c/w IVF   - c3,c4, bella, gb membrane, ck, hep b/c, neutrophil cytoplasmic ab,  - nephro on board     # Sensation of parasites  - resolved today   - utox , drug screen  - will consider Psych consult     # DVTppx: Lovenox  # dispo: needs cm/  for placement KARLA MONROY 38y Male  MRN#: [id number]   CODE STATUS:FULLCODE      SUBJECTIVE  Patient is a 38y old Male who presents with a chief complaint of "parasites on skin, itching" (2019 10:19)  Currently admitted to medicine with the primary diagnosis of Sickle cell crises   Today is hospital day 1d, and this morning he is complaining of bone pain, chest tightness, pain is not improving with current medications.     OBJECTIVE  PAST MEDICAL & SURGICAL HISTORY  Sickle cell anemia    ALLERGIES:  No Known Allergies    MEDICATIONS:  STANDING MEDICATIONS  chlorhexidine 4% Liquid 1 Application(s) Topical <User Schedule>  folic acid 1 milliGRAM(s) Oral daily  heparin  Injectable 5000 Unit(s) SubCutaneous every 8 hours  sodium chloride 0.9%. 1000 milliLiter(s) IV Continuous <Continuous>    PRN MEDICATIONS  acetaminophen   Tablet .. 650 milliGRAM(s) Oral every 6 hours PRN  hydrOXYzine hydrochloride 25 milliGRAM(s) Oral every 8 hours PRN  morphine  - Injectable 2 milliGRAM(s) IV Push every 6 hours PRN      VITAL SIGNS: Last 24 Hours  T(C): 36.6 (2019 05:00), Max: 36.7 (2019 13:48)  T(F): 97.8 (2019 05:00), Max: 98 (2019 13:48)  HR: 100 (2019 06:00) (96 - 103)  BP: 158/92 (2019 06:00) (155/95 - 175/118)  BP(mean): --  RR: 19 (2019 05:00) (18 - 19)  SpO2: 87% (2019 11:00) (87% - 95%)    LABS:                        7.5    11.24 )-----------( 317      ( 2019 19:12 )             20.4     02-17    137  |  107  |  34<H>  ----------------------------<  102<H>  5.4<H>   |  19  |  3.0<H>    Ca    8.7      2019 19:12    TPro  7.3  /  Alb  3.6  /  TBili  1.8<H>  /  DBili  x   /  AST  104<H>  /  ALT  42<H>  /  AlkPhos  141<H>  02-17    PT/INR - ( 2019 21:38 )   PT: 13.60 sec;   INR: 1.18 ratio         PTT - ( 2019 21:38 )  PTT:27.5 sec  Urinalysis Basic - ( 2019 22:24 )    Color: Yellow / Appearance: Clear / S.015 / pH: x  Gluc: x / Ketone: Negative  / Bili: Small / Urobili: 1.0 mg/dL   Blood: x / Protein: >=300 mg/dL / Nitrite: Negative   Leuk Esterase: Negative / RBC: 3-5 /HPF / WBC x   Sq Epi: x / Non Sq Epi: Few /HPF / Bacteria: Few /HPF    PHYSICAL EXAM:    GENERAL: AAOx3, looks in distress   HEENT:  Atraumatic, Normocephalic. EOMI, PERRLA, conjunctiva and sclera clear, No JVD  PULMONARY: Clear to auscultation bilaterally; No wheeze  CARDIOVASCULAR: Regular rate and rhythm;   GASTROINTESTINAL: Soft, Nontender, Nondistended;  MUSCULOSKELETAL:  2+ Peripheral Pulses, No clubbing, cyanosis, or edema  NEUROLOGY: non-focal  SKIN: No rashes or lesions    ASSESSMENT & PLAN  38 M w/ sickle cell disease c/o parasites crawling on him, found to have hgb 5.5, being admitted for sickle cell crisis.     # Sickle cell crises/ moderate ACS/ tansaminities and KAMRAN?  - Hbg 5.5 on admission, O2 sat is 87 % on room air, complains of chest tightness, bilateral opacity in CXR.  - v/q scan to r/o PE, EKG and CE to rule out ACS.  - CK to r/o rhabdomyolysis   - C/W IV hydration, NS @ 100,  - start ceftaxime and azithromycin   - s/p 2u prbc, retic 4.%, follow up hemolysis panel  - Hydromorphone 2 q4 for pain  - c/w folic acid  - Incentive spirometry, and DVT ppx   - Hematology consult     # Likely KAMRAN on CKD 3 vs CKD   - proteuria, no urine RBC with positive dipstick due to hemolysis, r/o rhabdo   - c/w IVF   - c3,c4, bella, gb membrane, ck, hep b/c, neutrophil cytoplasmic ab,  - nephro on board     # Sensation of parasites  - resolved today   - utox , drug screen  - will consider Psych consult     # DVTppx: Lovenox  # dispo: needs cm/  for placement KARLA MONROY 38y Male  MRN#: [id number]   CODE STATUS:FULLCODE      SUBJECTIVE  Patient is a 38y old Male who presents with a chief complaint of "parasites on skin, itching" (2019 10:19)  Currently admitted to medicine with the primary diagnosis of Sickle cell crises   Today is hospital day 1d, and this morning he is complaining of bone pain, chest tightness, pain is not improving with current medications.     OBJECTIVE  PAST MEDICAL & SURGICAL HISTORY  Sickle cell anemia    ALLERGIES:  No Known Allergies    MEDICATIONS:  STANDING MEDICATIONS  chlorhexidine 4% Liquid 1 Application(s) Topical <User Schedule>  folic acid 1 milliGRAM(s) Oral daily  heparin  Injectable 5000 Unit(s) SubCutaneous every 8 hours  sodium chloride 0.9%. 1000 milliLiter(s) IV Continuous <Continuous>    PRN MEDICATIONS  acetaminophen   Tablet .. 650 milliGRAM(s) Oral every 6 hours PRN  hydrOXYzine hydrochloride 25 milliGRAM(s) Oral every 8 hours PRN  morphine  - Injectable 2 milliGRAM(s) IV Push every 6 hours PRN      VITAL SIGNS: Last 24 Hours  T(C): 36.6 (2019 05:00), Max: 36.7 (2019 13:48)  T(F): 97.8 (2019 05:00), Max: 98 (2019 13:48)  HR: 100 (2019 06:00) (96 - 103)  BP: 158/92 (2019 06:00) (155/95 - 175/118)  BP(mean): --  RR: 19 (2019 05:00) (18 - 19)  SpO2: 87% (2019 11:00) (87% - 95%)    LABS:                        7.5    11.24 )-----------( 317      ( 2019 19:12 )             20.4     02-17    137  |  107  |  34<H>  ----------------------------<  102<H>  5.4<H>   |  19  |  3.0<H>    Ca    8.7      2019 19:12    TPro  7.3  /  Alb  3.6  /  TBili  1.8<H>  /  DBili  x   /  AST  104<H>  /  ALT  42<H>  /  AlkPhos  141<H>  02-17    PT/INR - ( 2019 21:38 )   PT: 13.60 sec;   INR: 1.18 ratio         PTT - ( 2019 21:38 )  PTT:27.5 sec  Urinalysis Basic - ( 2019 22:24 )    Color: Yellow / Appearance: Clear / S.015 / pH: x  Gluc: x / Ketone: Negative  / Bili: Small / Urobili: 1.0 mg/dL   Blood: x / Protein: >=300 mg/dL / Nitrite: Negative   Leuk Esterase: Negative / RBC: 3-5 /HPF / WBC x   Sq Epi: x / Non Sq Epi: Few /HPF / Bacteria: Few /HPF    PHYSICAL EXAM:    GENERAL: AAOx3, looks in distress   HEENT:  Atraumatic, Normocephalic. EOMI, PERRLA, conjunctiva and sclera clear, No JVD  PULMONARY: Clear to auscultation bilaterally; No wheeze  CARDIOVASCULAR: Regular rate and rhythm;   GASTROINTESTINAL: Soft, Nontender, Nondistended;  MUSCULOSKELETAL:  2+ Peripheral Pulses, No clubbing, cyanosis, or edema  NEUROLOGY: non-focal  SKIN: No rashes or lesions    ASSESSMENT & PLAN  38 M w/ sickle cell disease c/o parasites crawling on him, found to have hgb 5.5, being admitted for sickle cell crisis.     # Sickle cell crises/ moderate ACS/ tansaminities and KAMRAN?  - Hbg 5.5 on admission, O2 sat is 87 % on room air, complains of chest tightness, bilateral opacity in CXR.  - v/q scan to r/o PE, EKG and CE to rule out ACS.  - CK to r/o rhabdomyolysis   - C/W IV hydration, NS @ 100,  - start ceftriaxone and azithromycin   - s/p 2u prbc, retic 4.%, follow up hemolysis panel  - Hydromorphone 2 q4 for pain  - c/w folic acid  - Incentive spirometry, and DVT ppx   - Hematology consult     # Likely KAMRAN on CKD 3 vs CKD   - proteuria, no urine RBC with positive dipstick due to hemolysis, r/o rhabdo   - c/w IVF   - c3,c4, bella, gb membrane, ck, hep b/c, neutrophil cytoplasmic ab,  - nephro on board     # Sensation of parasites  - resolved today   - utox , drug screen  - will consider Psych consult     # DVTppx: Lovenox  # dispo: needs cm/  for placement

## 2019-02-19 LAB
% ALBUMIN: 47.8 % — SIGNIFICANT CHANGE UP
% ALPHA 1: 6.5 % — SIGNIFICANT CHANGE UP
% ALPHA 2: 7.8 % — SIGNIFICANT CHANGE UP
% BETA: 9.5 % — SIGNIFICANT CHANGE UP
% GAMMA: 28.4 % — SIGNIFICANT CHANGE UP
ALBUMIN SERPL ELPH-MCNC: 3.2 G/DL — LOW (ref 3.5–5.2)
ALBUMIN SERPL ELPH-MCNC: 3.3 G/DL — LOW (ref 3.6–5.5)
ALBUMIN/GLOB SERPL ELPH: 0.9 RATIO — SIGNIFICANT CHANGE UP
ALLERGY+IMMUNOLOGY DIAG STUDY NOTE: SIGNIFICANT CHANGE UP
ALLERGY+IMMUNOLOGY DIAG STUDY NOTE: SIGNIFICANT CHANGE UP
ALP SERPL-CCNC: 143 U/L — HIGH (ref 30–115)
ALPHA1 GLOB SERPL ELPH-MCNC: 0.4 G/DL — SIGNIFICANT CHANGE UP (ref 0.1–0.4)
ALPHA2 GLOB SERPL ELPH-MCNC: 0.5 G/DL — SIGNIFICANT CHANGE UP (ref 0.5–1)
ALT FLD-CCNC: 41 U/L — SIGNIFICANT CHANGE UP (ref 0–41)
ANION GAP SERPL CALC-SCNC: 13 MMOL/L — SIGNIFICANT CHANGE UP (ref 7–14)
ANTIBODY INTERPRETATION 2: SIGNIFICANT CHANGE UP
ANTIBODY INTERPRETATION 3: SIGNIFICANT CHANGE UP
ANTIBODY INTERPRETATION 4: SIGNIFICANT CHANGE UP
AST SERPL-CCNC: 85 U/L — HIGH (ref 0–41)
B-GLOBULIN SERPL ELPH-MCNC: 0.6 G/DL — SIGNIFICANT CHANGE UP (ref 0.5–1)
BASOPHILS # BLD AUTO: 0.05 K/UL — SIGNIFICANT CHANGE UP (ref 0–0.2)
BASOPHILS NFR BLD AUTO: 0.5 % — SIGNIFICANT CHANGE UP (ref 0–1)
BILIRUB SERPL-MCNC: 1.7 MG/DL — HIGH (ref 0.2–1.2)
BLD GP AB SCN SERPL QL: ABNORMAL
BUN SERPL-MCNC: 42 MG/DL — HIGH (ref 10–20)
C3 SERPL-MCNC: 89 MG/DL — SIGNIFICANT CHANGE UP (ref 81–157)
C4 SERPL-MCNC: 27 MG/DL — SIGNIFICANT CHANGE UP (ref 13–39)
CALCIUM SERPL-MCNC: 8.6 MG/DL — SIGNIFICANT CHANGE UP (ref 8.4–10.5)
CALCIUM SERPL-MCNC: 8.8 MG/DL — SIGNIFICANT CHANGE UP (ref 8.5–10.1)
CHLORIDE SERPL-SCNC: 109 MMOL/L — SIGNIFICANT CHANGE UP (ref 98–110)
CK MB CFR SERPL CALC: 1 NG/ML — SIGNIFICANT CHANGE UP (ref 0.6–6.3)
CK SERPL-CCNC: 39 U/L — SIGNIFICANT CHANGE UP (ref 0–225)
CO2 SERPL-SCNC: 17 MMOL/L — SIGNIFICANT CHANGE UP (ref 17–32)
CREAT SERPL-MCNC: 2.7 MG/DL — HIGH (ref 0.7–1.5)
CULTURE RESULTS: SIGNIFICANT CHANGE UP
CULTURE RESULTS: SIGNIFICANT CHANGE UP
DIR ANTIGLOB POLYSPECIFIC INTERPRETATION: SIGNIFICANT CHANGE UP
EOSINOPHIL # BLD AUTO: 0.54 K/UL — SIGNIFICANT CHANGE UP (ref 0–0.7)
EOSINOPHIL NFR BLD AUTO: 5 % — SIGNIFICANT CHANGE UP (ref 0–8)
GAMMA GLOBULIN: 1.9 G/DL — HIGH (ref 0.6–1.6)
GLUCOSE SERPL-MCNC: 98 MG/DL — SIGNIFICANT CHANGE UP (ref 70–99)
HAPTOGLOB SERPL-MCNC: <20 MG/DL — LOW (ref 34–200)
HCT VFR BLD CALC: 19.3 % — LOW (ref 42–52)
HGB BLD-MCNC: 6.9 G/DL — CRITICAL LOW (ref 14–18)
IMM GRANULOCYTES NFR BLD AUTO: 0.8 % — HIGH (ref 0.1–0.3)
LDH SERPL L TO P-CCNC: 1158 U/L — HIGH (ref 50–242)
LYMPHOCYTES # BLD AUTO: 1.95 K/UL — SIGNIFICANT CHANGE UP (ref 1.2–3.4)
LYMPHOCYTES # BLD AUTO: 18 % — LOW (ref 20.5–51.1)
MAGNESIUM SERPL-MCNC: 1.6 MG/DL — LOW (ref 1.8–2.4)
MCHC RBC-ENTMCNC: 30.7 PG — SIGNIFICANT CHANGE UP (ref 27–31)
MCHC RBC-ENTMCNC: 35.8 G/DL — SIGNIFICANT CHANGE UP (ref 32–37)
MCV RBC AUTO: 85.8 FL — SIGNIFICANT CHANGE UP (ref 80–94)
MONOCYTES # BLD AUTO: 1.32 K/UL — HIGH (ref 0.1–0.6)
MONOCYTES NFR BLD AUTO: 12.2 % — HIGH (ref 1.7–9.3)
NEUTROPHILS # BLD AUTO: 6.86 K/UL — HIGH (ref 1.4–6.5)
NEUTROPHILS NFR BLD AUTO: 63.5 % — SIGNIFICANT CHANGE UP (ref 42.2–75.2)
NRBC # BLD: 27 /100 WBCS — HIGH (ref 0–0)
PHOSPHATE SERPL-MCNC: 4.8 MG/DL — SIGNIFICANT CHANGE UP (ref 2.1–4.9)
PLATELET # BLD AUTO: 311 K/UL — SIGNIFICANT CHANGE UP (ref 130–400)
POTASSIUM SERPL-MCNC: 4.6 MMOL/L — SIGNIFICANT CHANGE UP (ref 3.5–5)
POTASSIUM SERPL-SCNC: 4.6 MMOL/L — SIGNIFICANT CHANGE UP (ref 3.5–5)
PROT PATTERN SERPL ELPH-IMP: SIGNIFICANT CHANGE UP
PROT SERPL-MCNC: 6.6 G/DL — SIGNIFICANT CHANGE UP (ref 6–8)
PROT SERPL-MCNC: 6.8 G/DL — SIGNIFICANT CHANGE UP (ref 6–8.3)
PROT SERPL-MCNC: 6.8 G/DL — SIGNIFICANT CHANGE UP (ref 6–8.3)
PTH-INTACT FLD-MCNC: 177 PG/ML — HIGH (ref 15–65)
RBC # BLD: 2.25 M/UL — LOW (ref 4.7–6.1)
RBC # BLD: 2.25 M/UL — LOW (ref 4.7–6.1)
RBC # FLD: 23.4 % — HIGH (ref 11.5–14.5)
RETICS #: 469.6 K/UL — HIGH (ref 25–125)
RETICS/RBC NFR: 20.9 % — HIGH (ref 0.5–1.5)
SODIUM SERPL-SCNC: 139 MMOL/L — SIGNIFICANT CHANGE UP (ref 135–146)
SPECIMEN SOURCE: SIGNIFICANT CHANGE UP
SPECIMEN SOURCE: SIGNIFICANT CHANGE UP
TROPONIN T SERPL-MCNC: 0.02 NG/ML — HIGH
TYPE + AB SCN PNL BLD: SIGNIFICANT CHANGE UP
WBC # BLD: 10.81 K/UL — HIGH (ref 4.8–10.8)
WBC # FLD AUTO: 10.81 K/UL — HIGH (ref 4.8–10.8)

## 2019-02-19 RX ORDER — HYDROMORPHONE HYDROCHLORIDE 2 MG/ML
4 INJECTION INTRAMUSCULAR; INTRAVENOUS; SUBCUTANEOUS EVERY 4 HOURS
Qty: 0 | Refills: 0 | Status: DISCONTINUED | OUTPATIENT
Start: 2019-02-19 | End: 2019-02-20

## 2019-02-19 RX ORDER — SODIUM CHLORIDE 9 MG/ML
1000 INJECTION INTRAMUSCULAR; INTRAVENOUS; SUBCUTANEOUS
Qty: 0 | Refills: 0 | Status: DISCONTINUED | OUTPATIENT
Start: 2019-02-19 | End: 2019-02-21

## 2019-02-19 RX ORDER — HYDROMORPHONE HYDROCHLORIDE 2 MG/ML
2 INJECTION INTRAMUSCULAR; INTRAVENOUS; SUBCUTANEOUS EVERY 4 HOURS
Qty: 0 | Refills: 0 | Status: DISCONTINUED | OUTPATIENT
Start: 2019-02-19 | End: 2019-02-19

## 2019-02-19 RX ORDER — SODIUM CHLORIDE 9 MG/ML
1000 INJECTION INTRAMUSCULAR; INTRAVENOUS; SUBCUTANEOUS
Qty: 0 | Refills: 0 | Status: DISCONTINUED | OUTPATIENT
Start: 2019-02-19 | End: 2019-02-19

## 2019-02-19 RX ORDER — NIFEDIPINE 30 MG
30 TABLET, EXTENDED RELEASE 24 HR ORAL DAILY
Qty: 0 | Refills: 0 | Status: DISCONTINUED | OUTPATIENT
Start: 2019-02-19 | End: 2019-02-20

## 2019-02-19 RX ADMIN — CHLORHEXIDINE GLUCONATE 1 APPLICATION(S): 213 SOLUTION TOPICAL at 06:33

## 2019-02-19 RX ADMIN — Medication 50 MILLIGRAM(S): at 00:36

## 2019-02-19 RX ADMIN — HYDROMORPHONE HYDROCHLORIDE 4 MILLIGRAM(S): 2 INJECTION INTRAMUSCULAR; INTRAVENOUS; SUBCUTANEOUS at 15:00

## 2019-02-19 RX ADMIN — HYDROMORPHONE HYDROCHLORIDE 2 MILLIGRAM(S): 2 INJECTION INTRAMUSCULAR; INTRAVENOUS; SUBCUTANEOUS at 00:35

## 2019-02-19 RX ADMIN — HYDROMORPHONE HYDROCHLORIDE 2 MILLIGRAM(S): 2 INJECTION INTRAMUSCULAR; INTRAVENOUS; SUBCUTANEOUS at 06:32

## 2019-02-19 RX ADMIN — HYDROMORPHONE HYDROCHLORIDE 2 MILLIGRAM(S): 2 INJECTION INTRAMUSCULAR; INTRAVENOUS; SUBCUTANEOUS at 14:04

## 2019-02-19 RX ADMIN — HYDROMORPHONE HYDROCHLORIDE 4 MILLIGRAM(S): 2 INJECTION INTRAMUSCULAR; INTRAVENOUS; SUBCUTANEOUS at 16:58

## 2019-02-19 RX ADMIN — SODIUM CHLORIDE 150 MILLILITER(S): 9 INJECTION INTRAMUSCULAR; INTRAVENOUS; SUBCUTANEOUS at 09:06

## 2019-02-19 RX ADMIN — Medication 50 MILLIGRAM(S): at 17:13

## 2019-02-19 RX ADMIN — Medication 50 MILLIGRAM(S): at 11:26

## 2019-02-19 RX ADMIN — Medication 50 MILLIGRAM(S): at 06:33

## 2019-02-19 RX ADMIN — HEPARIN SODIUM 5000 UNIT(S): 5000 INJECTION INTRAVENOUS; SUBCUTANEOUS at 06:33

## 2019-02-19 RX ADMIN — HEPARIN SODIUM 5000 UNIT(S): 5000 INJECTION INTRAVENOUS; SUBCUTANEOUS at 14:03

## 2019-02-19 RX ADMIN — HYDROMORPHONE HYDROCHLORIDE 2 MILLIGRAM(S): 2 INJECTION INTRAMUSCULAR; INTRAVENOUS; SUBCUTANEOUS at 00:55

## 2019-02-19 RX ADMIN — AZITHROMYCIN 255 MILLIGRAM(S): 500 TABLET, FILM COATED ORAL at 12:34

## 2019-02-19 RX ADMIN — HYDROMORPHONE HYDROCHLORIDE 4 MILLIGRAM(S): 2 INJECTION INTRAMUSCULAR; INTRAVENOUS; SUBCUTANEOUS at 21:55

## 2019-02-19 RX ADMIN — HEPARIN SODIUM 5000 UNIT(S): 5000 INJECTION INTRAVENOUS; SUBCUTANEOUS at 21:13

## 2019-02-19 RX ADMIN — HYDROMORPHONE HYDROCHLORIDE 2 MILLIGRAM(S): 2 INJECTION INTRAMUSCULAR; INTRAVENOUS; SUBCUTANEOUS at 13:05

## 2019-02-19 RX ADMIN — HYDROMORPHONE HYDROCHLORIDE 2 MILLIGRAM(S): 2 INJECTION INTRAMUSCULAR; INTRAVENOUS; SUBCUTANEOUS at 06:55

## 2019-02-19 RX ADMIN — HYDROMORPHONE HYDROCHLORIDE 4 MILLIGRAM(S): 2 INJECTION INTRAMUSCULAR; INTRAVENOUS; SUBCUTANEOUS at 21:13

## 2019-02-19 RX ADMIN — HYDROMORPHONE HYDROCHLORIDE 2 MILLIGRAM(S): 2 INJECTION INTRAMUSCULAR; INTRAVENOUS; SUBCUTANEOUS at 09:07

## 2019-02-19 RX ADMIN — Medication 1 MILLIGRAM(S): at 11:26

## 2019-02-19 RX ADMIN — Medication 30 MILLIGRAM(S): at 11:26

## 2019-02-19 RX ADMIN — HYDROMORPHONE HYDROCHLORIDE 2 MILLIGRAM(S): 2 INJECTION INTRAMUSCULAR; INTRAVENOUS; SUBCUTANEOUS at 09:52

## 2019-02-19 RX ADMIN — CEFTRIAXONE 100 GRAM(S): 500 INJECTION, POWDER, FOR SOLUTION INTRAMUSCULAR; INTRAVENOUS at 11:31

## 2019-02-19 NOTE — PROGRESS NOTE ADULT - SUBJECTIVE AND OBJECTIVE BOX
Nephrology progress note    Patient is seen and examined, events over the last 24 h noted .    Allergies:  No Known Allergies    Hospital Medications:   MEDICATIONS  (STANDING):  azithromycin  IVPB      azithromycin  IVPB 500 milliGRAM(s) IV Intermittent every 24 hours  cefTRIAXone   IVPB 1 Gram(s) IV Intermittent every 24 hours  chlorhexidine 4% Liquid 1 Application(s) Topical <User Schedule>  diphenhydrAMINE   Injectable 50 milliGRAM(s) IV Push every 6 hours  folic acid 1 milliGRAM(s) Oral daily  heparin  Injectable 5000 Unit(s) SubCutaneous every 8 hours  sodium chloride 0.9%. 1000 milliLiter(s) (150 mL/Hr) IV Continuous <Continuous>        VITALS:  T(F): 96.6 (19 @ 05:48), Max: 98.7 (19 @ 14:13)  HR: 93 (19 @ 05:48)  BP: 170/100 (19 @ 09:49)  RR: 19 (19 @ 05:48)  SpO2: 99% (19 @ 05:48)  Wt(kg): --     @ 07:01  -   @ 07:00  --------------------------------------------------------  IN: 975 mL / OUT: 1620 mL / NET: -645 mL     @ 07:01  -   @ 07:00  --------------------------------------------------------  IN: 0 mL / OUT: 800 mL / NET: -800 mL          PHYSICAL EXAM:  Constitutional: NAD  HEENT: anicteric sclera, oropharynx clear, MMM  Neck: No JVD  Respiratory: CTAB, no wheezes, rales or rhonchi  Cardiovascular: S1, S2, RRR  Gastrointestinal: BS+, soft, NT/ND  Extremities: No cyanosis or clubbing. No peripheral edema  :  No carreon.   Skin: No rashes    LABS:      139  |  109  |  42<H>  ----------------------------<  98  4.6   |  17  |  2.7<H>    Ca    8.8      2019 05:51  Phos  4.8       Mg     1.6         TPro  6.6  /  Alb  3.2<L>  /  TBili  1.7<H>  /  DBili      /  AST  85<H>  /  ALT  41  /  AlkPhos  143<H>                            6.9    10.81 )-----------( 311      ( 2019 05:51 )             19.3       Urine Studies:  Urinalysis Basic - ( 2019 22:24 )    Color: Yellow / Appearance: Clear / S.015 / pH:   Gluc:  / Ketone: Negative  / Bili: Small / Urobili: 1.0 mg/dL   Blood:  / Protein: >=300 mg/dL / Nitrite: Negative   Leuk Esterase: Negative / RBC: 3-5 /HPF / WBC    Sq Epi:  / Non Sq Epi: Few /HPF / Bacteria: Few /HPF      Sodium, Random Urine: 52.0 mmoL/L ( @ 10:30)  Chloride, Random Urine: 44 ( @ 10:30)  Potassium, Random Urine: 31 mmol/L ( @ 10:30)  Creatinine, Random Urine: 81 mg/dL ( @ 10:30)    RADIOLOGY & ADDITIONAL STUDIES: Nephrology progress note    Patient is seen and examined, events over the last 24 h noted .  no complaints   No events     Allergies:  No Known Allergies    Hospital Medications:   MEDICATIONS  (STANDING):  azithromycin  IVPB      azithromycin  IVPB 500 milliGRAM(s) IV Intermittent every 24 hours  cefTRIAXone   IVPB 1 Gram(s) IV Intermittent every 24 hours  chlorhexidine 4% Liquid 1 Application(s) Topical <User Schedule>  diphenhydrAMINE   Injectable 50 milliGRAM(s) IV Push every 6 hours  folic acid 1 milliGRAM(s) Oral daily  heparin  Injectable 5000 Unit(s) SubCutaneous every 8 hours  sodium chloride 0.9%. 1000 milliLiter(s) (150 mL/Hr) IV Continuous <Continuous>        VITALS:  T(F): 96.6 (19 @ 05:48), Max: 98.7 (19 @ 14:13)  HR: 93 (19 @ 05:48)  BP: 170/100 (19 @ 09:49)  RR: 19 (19 @ 05:48)  SpO2: 99% (19 @ 05:48)       @ 07:01  -   @ 07:00  --------------------------------------------------------  IN: 975 mL / OUT: 1620 mL / NET: -645 mL     @ 07:01  -   @ 07:00  --------------------------------------------------------  IN: 0 mL / OUT: 800 mL / NET: -800 mL          PHYSICAL EXAM:  Constitutional: NAD  HEENT: anicteric sclera, oropharynx clear, MMM  Neck: No JVD  Respiratory: CTAB, no wheezes, rales or rhonchi  Cardiovascular: S1, S2, RRR  Gastrointestinal: BS+, soft, NT/ND  Extremities: No cyanosis or clubbing. No peripheral edema  :  No carreon.   Skin: No rashes    LABS:      139  |  109  |  42<H>  ----------------------------<  98  4.6   |  17  |  2.7<H>    Creatinine Trend: 2.7<--, 2.8<--, 3.0<--, 3.4<--    Ca    8.8      2019 05:51  Phos  4.8       Mg     1.6         TPro  6.6  /  Alb  3.2<L>  /  TBili  1.7<H>  /  DBili      /  AST  85<H>  /  ALT  41  /  AlkPhos  143<H>    Creatine Kinase, Serum: 54 U/L (19 @ 20:05)                            6.9    10.81 )-----------( 311      ( 2019 05:51 )             19.3   Hemoglobin: 6.9 g/dL ( @ 05:51)  Hemoglobin: 7.2 g/dL ( @ 20:05)  Hemoglobin: 7.5 g/dL ( @ 19:12)  Hemoglobin: 5.5 g/dL ( @ 21:38)      Urine Studies:  Urinalysis Basic - ( 2019 22:24 )    Color: Yellow / Appearance: Clear / S.015 / pH:   Gluc:  / Ketone: Negative  / Bili: Small / Urobili: 1.0 mg/dL   Blood:  / Protein: >=300 mg/dL / Nitrite: Negative   Leuk Esterase: Negative / RBC: 3-5 /HPF / WBC    Sq Epi:  / Non Sq Epi: Few /HPF / Bacteria: Few /HPF      Sodium, Random Urine: 52.0 mmoL/L ( @ 10:30)  Chloride, Random Urine: 44 ( @ 10:30)  Potassium, Random Urine: 31 mmol/L ( @ 10:30)  Creatinine, Random Urine: 81 mg/dL ( @ 10:30)    RADIOLOGY & ADDITIONAL STUDIES:  < from: US Retroperitoneal Complete (19 @ 11:52) >    IMPRESSION:    Increased echogenicity of the bilateral kidneys compatible with medical   renal disease.    < end of copied text >

## 2019-02-19 NOTE — CONSULT NOTE ADULT - SUBJECTIVE AND OBJECTIVE BOX
Patient is a 38y old  Male who presents with a chief complaint of sickle cell pain crisis (19 Feb 2019 08:04)      HPI:  39 yo m with pmh of sickle cell disease, on hydroxyurea only, says baseline hgb of 7, presents with c/o nausea and "parasites" on skin and stool.  pt says he had been feeling very itchy all over for 2 weeks. no pain, paresthesia, numbness. pt has been undomiciled for a few months.  pt downloaded a "magnifier saige" on his phone and slathered Vaseline on his skin to "suffocate" the parasites and took a video.  pt then had a bm last night and decided to also take a video of the toilet.  pt says he sees "mealworms".  pt feels fatigued and just feels "off".    In the ED the patient had a Hgb of 5.5, Retic 4.5, T&S obtained, and 2U PRBC ordered  Cr also noted to be 3, unsure what baseline is. (17 Feb 2019 03:13)      PAST MEDICAL & SURGICAL HISTORY:  Sickle cell anemia      SOCIAL HX:   Smoking    denies	                     ETOH      denies                      Other    FAMILY HISTORY:  :  No known cardiovascular family history     ROS:  See HPI     Allergies    No Known Allergies    Intolerances          PHYSICAL EXAM    ICU Vital Signs Last 24 Hrs  T(C): 36.5 (19 Feb 2019 13:57), Max: 36.5 (19 Feb 2019 13:57)  T(F): 97.7 (19 Feb 2019 13:57), Max: 97.7 (19 Feb 2019 13:57)  HR: 71 (19 Feb 2019 13:57) (71 - 95)  BP: 166/107 (19 Feb 2019 13:57) (166/107 - 176/114)  RR: 18 (19 Feb 2019 13:57) (18 - 19)  SpO2: 99% (19 Feb 2019 05:48) (99% - 99%)      General: In NAD   HEENT:  OLIVER   on venturi mask           Lymphatic system: No cervical LN   Lungs: Bilateral BS  Cardiovascular: Regular  Gastrointestinal: Soft, Positive BS  Musculoskeletal: No clubbing.  Moves all extremities.  Full range of motion   Skin: Warm.  Intact  Neurological: No motor or sensory deficit       02-18-19 @ 07:01  -  02-19-19 @ 07:00  --------------------------------------------------------  IN:  Total IN: 0 mL    OUT:    Voided: 800 mL  Total OUT: 800 mL    Total NET: -800 mL          LABS:                          6.9    10.81 )-----------( 311      ( 19 Feb 2019 05:51 )             19.3                                               02-19    139  |  109  |  42<H>  ----------------------------<  98  4.6   |  17  |  2.7<H>    Ca    8.8      19 Feb 2019 05:51  Phos  4.8     02-19  Mg     1.6     02-19    TPro  6.6  /  Alb  3.2<L>  /  TBili  1.7<H>  /  DBili  x   /  AST  85<H>  /  ALT  41  /  AlkPhos  143<H>  02-19                                                 CARDIAC MARKERS ( 19 Feb 2019 05:51 )  x     / 0.02 ng/mL / 39 U/L / x     / 1.0 ng/mL  CARDIAC MARKERS ( 18 Feb 2019 20:05 )  x     / 0.02 ng/mL / 54 U/L / x     / 1.3 ng/mL    LIVER FUNCTIONS - ( 19 Feb 2019 05:51 )  Alb: 3.2 g/dL / Pro: 6.6 g/dL / ALK PHOS: 143 U/L / ALT: 41 U/L / AST: 85 U/L / GGT: x                                                  Culture - Stool (collected 17 Feb 2019 19:52)  Source: .Stool Feces  Preliminary Report (18 Feb 2019 18:10):    No enteric pathogens to date: Final culture pending    No enteric gram negative rods isolated                                                                                           X-Rays    Bilateral interstitial opacity, RLL infiltrate                                                                                 ECHO EF 45%    MEDICATIONS  (STANDING):  azithromycin  IVPB      azithromycin  IVPB 500 milliGRAM(s) IV Intermittent every 24 hours  cefTRIAXone   IVPB 1 Gram(s) IV Intermittent every 24 hours  chlorhexidine 4% Liquid 1 Application(s) Topical <User Schedule>  diphenhydrAMINE   Injectable 50 milliGRAM(s) IV Push every 6 hours  folic acid 1 milliGRAM(s) Oral daily  heparin  Injectable 5000 Unit(s) SubCutaneous every 8 hours  NIFEdipine XL 30 milliGRAM(s) Oral daily  sodium chloride 0.9%. 1000 milliLiter(s) (100 mL/Hr) IV Continuous <Continuous>    MEDICATIONS  (PRN):  acetaminophen   Tablet .. 650 milliGRAM(s) Oral every 6 hours PRN Mild Pain (1 - 3)  HYDROmorphone  Injectable 4 milliGRAM(s) IV Push every 4 hours PRN Severe Pain (7 - 10)

## 2019-02-19 NOTE — PROGRESS NOTE ADULT - ASSESSMENT
KARLA MONROY 38y Male  MRN#: [id number]   CODE STATUS:FULLCODE      SUBJECTIVE  Patient is a 38y old Male who presents with a chief complaint of SCD crises (18 Feb 2019 11:27)  Currently admitted to medicine with the primary diagnosis of Sickle cell anemia  Today is hospital day 2d, and this morning he is states that pain is improving and reports no overnight events.   Denies any chest pain or shortness of breath, overall he feels better than yesterday.     OBJECTIVE  PAST MEDICAL & SURGICAL HISTORY  Sickle cell anemia    ALLERGIES:  No Known Allergies    MEDICATIONS:  STANDING MEDICATIONS  azithromycin  IVPB      azithromycin  IVPB 500 milliGRAM(s) IV Intermittent every 24 hours  cefTRIAXone   IVPB 1 Gram(s) IV Intermittent every 24 hours  chlorhexidine 4% Liquid 1 Application(s) Topical <User Schedule>  diphenhydrAMINE   Injectable 50 milliGRAM(s) IV Push every 6 hours  folic acid 1 milliGRAM(s) Oral daily  heparin  Injectable 5000 Unit(s) SubCutaneous every 8 hours  sodium chloride 0.9%. 1000 milliLiter(s) IV Continuous <Continuous>    PRN MEDICATIONS  acetaminophen   Tablet .. 650 milliGRAM(s) Oral every 6 hours PRN  HYDROmorphone  Injectable 2 milliGRAM(s) IV Push every 4 hours PRN      VITAL SIGNS: Last 24 Hours  T(C): 35.9 (19 Feb 2019 05:48), Max: 37.1 (18 Feb 2019 14:13)  T(F): 96.6 (19 Feb 2019 05:48), Max: 98.7 (18 Feb 2019 14:13)  HR: 93 (19 Feb 2019 05:48) (93 - 99)  BP: 176/114 (19 Feb 2019 05:48) (162/96 - 176/114)  BP(mean): --  RR: 19 (19 Feb 2019 05:48) (18 - 19)  SpO2: 99% (19 Feb 2019 05:48) (87% - 99%)    LABS:                        6.9    10.81 )-----------( 311      ( 19 Feb 2019 05:51 )             19.3     02-19    139  |  109  |  42<H>  ----------------------------<  98  4.6   |  17  |  2.7<H>    Ca    8.8      19 Feb 2019 05:51  Phos  4.8     02-19  Mg     1.6     02-19    TPro  6.6  /  Alb  3.2<L>  /  TBili  1.7<H>  /  DBili  x   /  AST  85<H>  /  ALT  41  /  AlkPhos  143<H>  02-19          Creatine Kinase, Serum: 39 U/L (02-19-19 @ 05:51)  Troponin T, Serum: 0.02 ng/mL <H> (02-19-19 @ 05:51)  Creatine Kinase, Serum: 54 U/L (02-18-19 @ 20:05)  Troponin T, Serum: 0.02 ng/mL <H> (02-18-19 @ 20:05)      Culture - Stool (collected 17 Feb 2019 19:52)  Source: .Stool Feces  Preliminary Report (18 Feb 2019 18:10):    No enteric pathogens to date: Final culture pending    No enteric gram negative rods isolated      CARDIAC MARKERS ( 19 Feb 2019 05:51 )  x     / 0.02 ng/mL / 39 U/L / x     / 1.0 ng/mL  CARDIAC MARKERS ( 18 Feb 2019 20:05 )  x     / 0.02 ng/mL / 54 U/L / x     / 1.3 ng/mL      RADIOLOGY:      PHYSICAL EXAM:    GENERAL: NAD, well-developed, AAOx3, mild distress  HEENT:  Atraumatic, Normocephalic. EOMI, PERRLA, conjunctiva and sclera clear,   PULMONARY: Clear to auscultation bilaterally; No wheeze  CARDIOVASCULAR: Regular rate and rhythm;   GASTROINTESTINAL: Soft, Nontender, Nondistended;  MUSCULOSKELETAL:  No clubbing, cyanosis, or edema  NEUROLOGY: non-focal  SKIN: No rashes or lesions      ASSESSMENT & PLAN  38 M w/ sickle cell disease c/o parasites crawling on him, found to have hgb 5.5, being admitted for sickle cell crisis.     # Vasoocclusive sickle cell disease pain/ Mild ACS?   - hbg dropped to 6.8 today   - on O2 mask,  - improving bilateral opacity on CXR  - v/q scan is negative, EKG and CE is negative.  - C/W IV hydration, NS @ 150 cc,  - start ceftriaxone and azithromycin   - s/p 2u prbc, will order two unites today   - Hydromorphone 2 q4 for pain  - c/w folic acid  - Incentive spirometry, and DVT ppx   - Hematology on board     # Likely KAMRAN on CKD 3 vs CKD   - Cr improving 3 > 2.7   - proteuria, no urine RBC with positive dipstick due to hemolysis, CK WNL  - c/w IVF   - nephro on board     # Sensation of parasites  - resolved   - utox , drug screen   - will consider Psych consult     # DVTppx: Lovenox  # dispo: needs cm/  for placement KARLA MONROY 38y Male  MRN#: [id number]   CODE STATUS:FULLCODE      SUBJECTIVE  Patient is a 38y old Male who presents with a chief complaint of SCD crises (18 Feb 2019 11:27)  Currently admitted to medicine with the primary diagnosis of Sickle cell anemia  Today is hospital day 2d, and this morning he is states that pain is improving and reports no overnight events.   Denies any chest pain or shortness of breath, overall he feels better than yesterday.     OBJECTIVE  PAST MEDICAL & SURGICAL HISTORY  Sickle cell anemia    ALLERGIES:  No Known Allergies    MEDICATIONS:  STANDING MEDICATIONS  azithromycin  IVPB      azithromycin  IVPB 500 milliGRAM(s) IV Intermittent every 24 hours  cefTRIAXone   IVPB 1 Gram(s) IV Intermittent every 24 hours  chlorhexidine 4% Liquid 1 Application(s) Topical <User Schedule>  diphenhydrAMINE   Injectable 50 milliGRAM(s) IV Push every 6 hours  folic acid 1 milliGRAM(s) Oral daily  heparin  Injectable 5000 Unit(s) SubCutaneous every 8 hours  sodium chloride 0.9%. 1000 milliLiter(s) IV Continuous <Continuous>    PRN MEDICATIONS  acetaminophen   Tablet .. 650 milliGRAM(s) Oral every 6 hours PRN  HYDROmorphone  Injectable 2 milliGRAM(s) IV Push every 4 hours PRN      VITAL SIGNS: Last 24 Hours  T(C): 35.9 (19 Feb 2019 05:48), Max: 37.1 (18 Feb 2019 14:13)  T(F): 96.6 (19 Feb 2019 05:48), Max: 98.7 (18 Feb 2019 14:13)  HR: 93 (19 Feb 2019 05:48) (93 - 99)  BP: 176/114 (19 Feb 2019 05:48) (162/96 - 176/114)  BP(mean): --  RR: 19 (19 Feb 2019 05:48) (18 - 19)  SpO2: 99% (19 Feb 2019 05:48) (87% - 99%)    LABS:                        6.9    10.81 )-----------( 311      ( 19 Feb 2019 05:51 )             19.3     02-19    139  |  109  |  42<H>  ----------------------------<  98  4.6   |  17  |  2.7<H>    Ca    8.8      19 Feb 2019 05:51  Phos  4.8     02-19  Mg     1.6     02-19    TPro  6.6  /  Alb  3.2<L>  /  TBili  1.7<H>  /  DBili  x   /  AST  85<H>  /  ALT  41  /  AlkPhos  143<H>  02-19          Creatine Kinase, Serum: 39 U/L (02-19-19 @ 05:51)  Troponin T, Serum: 0.02 ng/mL <H> (02-19-19 @ 05:51)  Creatine Kinase, Serum: 54 U/L (02-18-19 @ 20:05)  Troponin T, Serum: 0.02 ng/mL <H> (02-18-19 @ 20:05)      Culture - Stool (collected 17 Feb 2019 19:52)  Source: .Stool Feces  Preliminary Report (18 Feb 2019 18:10):    No enteric pathogens to date: Final culture pending    No enteric gram negative rods isolated      CARDIAC MARKERS ( 19 Feb 2019 05:51 )  x     / 0.02 ng/mL / 39 U/L / x     / 1.0 ng/mL  CARDIAC MARKERS ( 18 Feb 2019 20:05 )  x     / 0.02 ng/mL / 54 U/L / x     / 1.3 ng/mL    PHYSICAL EXAM:    GENERAL: NAD, well-developed, AAOx3, mild distress  HEENT:  Atraumatic, Normocephalic. EOMI, PERRLA, conjunctiva and sclera clear,   PULMONARY: Clear to auscultation bilaterally; No wheeze  CARDIOVASCULAR: Regular rate and rhythm;   GASTROINTESTINAL: Soft, Nontender, Nondistended;  MUSCULOSKELETAL:  No clubbing, cyanosis, or edema  NEUROLOGY: non-focal  SKIN: No rashes or lesions      ASSESSMENT & PLAN  38 M w/ sickle cell disease c/o parasites crawling on him, found to have hgb 5.5, being admitted for sickle cell vasoocclusive pain crisis .     # Vasoocclusive sickle cell disease pain/ Moderate ACS?   - hbg dropped to 6.8 today   - s/p 2u prbc on 2/17, will monitor Hb today will transfuse if significantly drops.  - O2 sat 95 on room air, improving   - improving bilateral opacity on CXR,   - v/q scan is negative,   - CE is negative and no acute ischemic changes in EKG  - C/W IV hydration, NS @ 150 cc,  - on ceftriaxone and azithromycin   - Hydromorphone 2 q4 for pain, will reassess later today.  - c/w folic acid  - Incentive spirometry, and DVT ppx   - Hematology on board     # Likely KAMRAN on CKD 3 vs CKD   - Cr improving 3 > 2.7   - proteuria, no urine RBC with positive dipstick due to hemolysis, CK WNL  - c/w IVF   - nephro on board     # Uncontrolled HTN   - will start Nifedipine ER 30 QD.     # Sensation of parasites  - resolved   - will consider Psych consult     # DVTppx: Lovenox  # dispo: needs cm/  for placement KARLA MONROY 38y Male  MRN#: [id number]   CODE STATUS:FULLCODE      SUBJECTIVE  Patient is a 38y old Male who presents with a chief complaint of SCD crises (18 Feb 2019 11:27)  Currently admitted to medicine with the primary diagnosis of Sickle cell anemia  Today is hospital day 2d, and this morning he is states that pain is improving and reports no overnight events.   Denies any chest pain or shortness of breath, overall he feels better than yesterday.     OBJECTIVE  PAST MEDICAL & SURGICAL HISTORY  Sickle cell anemia    ALLERGIES:  No Known Allergies    MEDICATIONS:  STANDING MEDICATIONS  azithromycin  IVPB      azithromycin  IVPB 500 milliGRAM(s) IV Intermittent every 24 hours  cefTRIAXone   IVPB 1 Gram(s) IV Intermittent every 24 hours  chlorhexidine 4% Liquid 1 Application(s) Topical <User Schedule>  diphenhydrAMINE   Injectable 50 milliGRAM(s) IV Push every 6 hours  folic acid 1 milliGRAM(s) Oral daily  heparin  Injectable 5000 Unit(s) SubCutaneous every 8 hours  sodium chloride 0.9%. 1000 milliLiter(s) IV Continuous <Continuous>    PRN MEDICATIONS  acetaminophen   Tablet .. 650 milliGRAM(s) Oral every 6 hours PRN  HYDROmorphone  Injectable 2 milliGRAM(s) IV Push every 4 hours PRN      VITAL SIGNS: Last 24 Hours  T(C): 35.9 (19 Feb 2019 05:48), Max: 37.1 (18 Feb 2019 14:13)  T(F): 96.6 (19 Feb 2019 05:48), Max: 98.7 (18 Feb 2019 14:13)  HR: 93 (19 Feb 2019 05:48) (93 - 99)  BP: 176/114 (19 Feb 2019 05:48) (162/96 - 176/114)  BP(mean): --  RR: 19 (19 Feb 2019 05:48) (18 - 19)  SpO2: 99% (19 Feb 2019 05:48) (87% - 99%)    LABS:                        6.9    10.81 )-----------( 311      ( 19 Feb 2019 05:51 )             19.3     02-19    139  |  109  |  42<H>  ----------------------------<  98  4.6   |  17  |  2.7<H>    Ca    8.8      19 Feb 2019 05:51  Phos  4.8     02-19  Mg     1.6     02-19    TPro  6.6  /  Alb  3.2<L>  /  TBili  1.7<H>  /  DBili  x   /  AST  85<H>  /  ALT  41  /  AlkPhos  143<H>  02-19          Creatine Kinase, Serum: 39 U/L (02-19-19 @ 05:51)  Troponin T, Serum: 0.02 ng/mL <H> (02-19-19 @ 05:51)  Creatine Kinase, Serum: 54 U/L (02-18-19 @ 20:05)  Troponin T, Serum: 0.02 ng/mL <H> (02-18-19 @ 20:05)      Culture - Stool (collected 17 Feb 2019 19:52)  Source: .Stool Feces  Preliminary Report (18 Feb 2019 18:10):    No enteric pathogens to date: Final culture pending    No enteric gram negative rods isolated      CARDIAC MARKERS ( 19 Feb 2019 05:51 )  x     / 0.02 ng/mL / 39 U/L / x     / 1.0 ng/mL  CARDIAC MARKERS ( 18 Feb 2019 20:05 )  x     / 0.02 ng/mL / 54 U/L / x     / 1.3 ng/mL    PHYSICAL EXAM:    GENERAL: NAD, well-developed, AAOx3, mild distress  HEENT:  Atraumatic, Normocephalic. EOMI, PERRLA, conjunctiva and sclera clear,   PULMONARY: Clear to auscultation bilaterally; No wheeze  CARDIOVASCULAR: Regular rate and rhythm;   GASTROINTESTINAL: Soft, Nontender, Nondistended;  MUSCULOSKELETAL:  No clubbing, cyanosis, or edema  NEUROLOGY: non-focal  SKIN: No rashes or lesions, scratch marks from scratching      ASSESSMENT & PLAN  38 M w/ sickle cell disease c/o parasites crawling on him, found to have hgb 5.5, being admitted for sickle cell vasoocclusive pain crisis .     # Vasoocclusive sickle cell disease pain/? acute chest syndrome   - hbg dropped to 6.8 today   - s/p 2u prbc on 2/17, will monitor Hb today will transfuse if significantly drops.  - O2 sat 95 on room air, improving   - improving bilateral opacities on CXR,   - v/q scan is negative,   - CE is negative and no acute ischemic changes in EKG  - C/W IV hydration, NS @ 150 cc,  - on ceftriaxone and azithromycin   - Hydromorphone 2mg IV q4 prn for pain, will reassess later today.  - c/w folic acid  - Incentive spirometry, and DVT ppx   - Hematology on board     # Likely KAMRAN on CKD 3 vs CKD   - Cr improving 3 > 2.7   - proteuria, no urine RBC with positive dipstick due to hemolysis, CK WNL  - c/w IVF   - nephro on board     # Uncontrolled HTN   - will start Nifedipine ER 30 QD.     # Sensation of parasites  - resolved   - will consider Psych consult     # DVTppx: Lovenox  # dispo: needs cm/  for placement

## 2019-02-19 NOTE — CONSULT NOTE ADULT - ASSESSMENT
Patient is a 38y Male with a pmh whom presented to the hospital with complaints of seeing parasites on his skin and itching. 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
IMPRESSION:    Sickle cell pain crisis  ?Acute chest syndrome       PLAN:    CNS: Avoid CNS depressants    HEENT: Oral care    PULMONARY:  HOB @ 45 degrees. Wean oxygen as tolerated. Goal SpO2 >92%    CARDIOVASCULAR: I=O    GI: GI prophylaxis.  Feeding     RENAL:  Follow up lytes.  Correct as needed    INFECTIOUS DISEASE: Follow up cultures. Continue azithromycin and ceftriaxone. Check RVP. Blood cultures.    HEMATOLOGICAL:  DVT prophylaxis. Will have transfusion per hem. No exchange transfusion at this time.    ENDOCRINE:  Follow up FS.  Insulin protocol if needed.    MUSCULOSKELETAL: OOB to chair    Patient does  not require MICU monitoring at this time. If status deteriorates recall.
38 yom with PMH of sickle cell disease, on hydroxyurea only (does not recall who his hematologist was), says baseline hgb of 7, presents with c/o nausea and "parasites" on skin and stool.  In the ED, the patient had a Hgb of 5.5, reticulocyte 4.5%, creatinine of 3.4. (No previous creatinine available for comparison.)  Patient responded appropriately to 2 units of PRBCs, with hemoglobin of 7.5.  Hematology consulted for concern of acute chest syndrome.    1. Sickle cell disease-CXR does not appear consistent with acute chest syndrome.  On my examination, patient is afebrile, saturating 90% on RA and denying shortness of breath.  His only complaint is fatigue.  Patient states that he has never had an exchange transfusion before and does not feel he needs one now.  Case discussed with Dr. Saldaña, who agrees that there is no need for an exchange transfusion at this time.  Check LDH, haptoglobin, hemoglobin electrophoresis (ordered).  Monitor CBC and transfuse if symptomatic or hemoglobin significantly decreased from reported baseline of 7.  Continue folic acid supplementation.  Nephrology workup for KAMRAN vs. CKD.  Hold hydroxyurea at this time.

## 2019-02-19 NOTE — PROGRESS NOTE ADULT - ASSESSMENT
Patient is a 38y Male  presented to the hospital with complaints of seeing parasites on his skin and itching.      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

## 2019-02-20 LAB
ALBUMIN SERPL ELPH-MCNC: 3.2 G/DL — LOW (ref 3.5–5.2)
ALP SERPL-CCNC: 140 U/L — HIGH (ref 30–115)
ALT FLD-CCNC: 36 U/L — SIGNIFICANT CHANGE UP (ref 0–41)
ANION GAP SERPL CALC-SCNC: 17 MMOL/L — HIGH (ref 7–14)
ANISOCYTOSIS BLD QL: SIGNIFICANT CHANGE UP
AST SERPL-CCNC: 87 U/L — HIGH (ref 0–41)
BASOPHILS # BLD AUTO: 0.07 K/UL — SIGNIFICANT CHANGE UP (ref 0–0.2)
BASOPHILS NFR BLD AUTO: 0.6 % — SIGNIFICANT CHANGE UP (ref 0–1)
BILIRUB SERPL-MCNC: 1.6 MG/DL — HIGH (ref 0.2–1.2)
BUN SERPL-MCNC: 38 MG/DL — HIGH (ref 10–20)
CALCIUM SERPL-MCNC: 8.6 MG/DL — SIGNIFICANT CHANGE UP (ref 8.5–10.1)
CHLORIDE SERPL-SCNC: 103 MMOL/L — SIGNIFICANT CHANGE UP (ref 98–110)
CO2 SERPL-SCNC: 17 MMOL/L — SIGNIFICANT CHANGE UP (ref 17–32)
CREAT SERPL-MCNC: 2.6 MG/DL — HIGH (ref 0.7–1.5)
DSDNA AB SER-ACNC: <12 IU/ML — SIGNIFICANT CHANGE UP
EOSINOPHIL # BLD AUTO: 0.75 K/UL — HIGH (ref 0–0.7)
EOSINOPHIL NFR BLD AUTO: 6.5 % — SIGNIFICANT CHANGE UP (ref 0–8)
GIANT PLATELETS BLD QL SMEAR: PRESENT — SIGNIFICANT CHANGE UP
GLUCOSE SERPL-MCNC: 82 MG/DL — SIGNIFICANT CHANGE UP (ref 70–99)
HCT VFR BLD CALC: 18.6 % — LOW (ref 42–52)
HCT VFR BLD CALC: 19.3 % — LOW (ref 42–52)
HGB BLD-MCNC: 6.5 G/DL — CRITICAL LOW (ref 14–18)
HGB BLD-MCNC: 6.9 G/DL — CRITICAL LOW (ref 14–18)
HGB S BLD QL: POSITIVE
HIV 1+2 AB+HIV1 P24 AG SERPL QL IA: SIGNIFICANT CHANGE UP
IMM GRANULOCYTES NFR BLD AUTO: 1.2 % — HIGH (ref 0.1–0.3)
LDH SERPL L TO P-CCNC: 1152 U/L — HIGH (ref 50–242)
LYMPHOCYTES # BLD AUTO: 2.44 K/UL — SIGNIFICANT CHANGE UP (ref 1.2–3.4)
LYMPHOCYTES # BLD AUTO: 21.1 % — SIGNIFICANT CHANGE UP (ref 20.5–51.1)
MACROCYTES BLD QL: SIGNIFICANT CHANGE UP
MAGNESIUM SERPL-MCNC: 1.6 MG/DL — LOW (ref 1.8–2.4)
MANUAL SMEAR VERIFICATION: SIGNIFICANT CHANGE UP
MCHC RBC-ENTMCNC: 30.4 PG — SIGNIFICANT CHANGE UP (ref 27–31)
MCHC RBC-ENTMCNC: 30.9 PG — SIGNIFICANT CHANGE UP (ref 27–31)
MCHC RBC-ENTMCNC: 34.9 G/DL — SIGNIFICANT CHANGE UP (ref 32–37)
MCHC RBC-ENTMCNC: 35.8 G/DL — SIGNIFICANT CHANGE UP (ref 32–37)
MCV RBC AUTO: 86.5 FL — SIGNIFICANT CHANGE UP (ref 80–94)
MCV RBC AUTO: 86.9 FL — SIGNIFICANT CHANGE UP (ref 80–94)
METAMYELOCYTES # FLD: 1.9 % — HIGH (ref 0–0)
MONOCYTES # BLD AUTO: 1.4 K/UL — HIGH (ref 0.1–0.6)
MONOCYTES NFR BLD AUTO: 12.1 % — HIGH (ref 1.7–9.3)
NEUTROPHILS # BLD AUTO: 6.75 K/UL — HIGH (ref 1.4–6.5)
NEUTROPHILS NFR BLD AUTO: 58.5 % — SIGNIFICANT CHANGE UP (ref 42.2–75.2)
NRBC # BLD: 24 /100 WBCS — HIGH (ref 0–0)
NRBC # BLD: 27 /100 WBCS — HIGH (ref 0–0)
NRBC # BLD: 31 /100 — HIGH (ref 0–0)
PHOSPHATE SERPL-MCNC: 4.8 MG/DL — SIGNIFICANT CHANGE UP (ref 2.1–4.9)
PLAT MORPH BLD: ABNORMAL
PLATELET # BLD AUTO: 258 K/UL — SIGNIFICANT CHANGE UP (ref 130–400)
PLATELET # BLD AUTO: 273 K/UL — SIGNIFICANT CHANGE UP (ref 130–400)
POIKILOCYTOSIS BLD QL AUTO: SIGNIFICANT CHANGE UP
POLYCHROMASIA BLD QL SMEAR: SIGNIFICANT CHANGE UP
POTASSIUM SERPL-MCNC: 5.2 MMOL/L — HIGH (ref 3.5–5)
POTASSIUM SERPL-SCNC: 5.2 MMOL/L — HIGH (ref 3.5–5)
PROT SERPL-MCNC: 6.8 G/DL — SIGNIFICANT CHANGE UP (ref 6–8)
RBC # BLD: 2.14 M/UL — LOW (ref 4.7–6.1)
RBC # BLD: 2.23 M/UL — LOW (ref 4.7–6.1)
RBC # BLD: 2.23 M/UL — LOW (ref 4.7–6.1)
RBC # FLD: 22.5 % — HIGH (ref 11.5–14.5)
RBC # FLD: 22.7 % — HIGH (ref 11.5–14.5)
RBC BLD AUTO: ABNORMAL
RETICS #: 512.3 K/UL — HIGH (ref 25–125)
RETICS/RBC NFR: 22.9 % — HIGH (ref 0.5–1.5)
SICKLE CELLS BLD QL SMEAR: SIGNIFICANT CHANGE UP
SODIUM SERPL-SCNC: 137 MMOL/L — SIGNIFICANT CHANGE UP (ref 135–146)
SOLUBILITY: POSITIVE
TARGETS BLD QL SMEAR: SLIGHT — SIGNIFICANT CHANGE UP
VARIANT LYMPHS # BLD: 1.9 % — SIGNIFICANT CHANGE UP (ref 0–5)
WBC # BLD: 11.55 K/UL — HIGH (ref 4.8–10.8)
WBC # BLD: 12.28 K/UL — HIGH (ref 4.8–10.8)
WBC # FLD AUTO: 11.55 K/UL — HIGH (ref 4.8–10.8)
WBC # FLD AUTO: 12.28 K/UL — HIGH (ref 4.8–10.8)

## 2019-02-20 RX ORDER — HYDROMORPHONE HYDROCHLORIDE 2 MG/ML
1 INJECTION INTRAMUSCULAR; INTRAVENOUS; SUBCUTANEOUS EVERY 6 HOURS
Qty: 0 | Refills: 0 | Status: DISCONTINUED | OUTPATIENT
Start: 2019-02-20 | End: 2019-02-22

## 2019-02-20 RX ORDER — ONDANSETRON 8 MG/1
4 TABLET, FILM COATED ORAL EVERY 6 HOURS
Qty: 0 | Refills: 0 | Status: DISCONTINUED | OUTPATIENT
Start: 2019-02-20 | End: 2019-02-22

## 2019-02-20 RX ORDER — NIFEDIPINE 30 MG
60 TABLET, EXTENDED RELEASE 24 HR ORAL DAILY
Qty: 0 | Refills: 0 | Status: DISCONTINUED | OUTPATIENT
Start: 2019-02-21 | End: 2019-02-22

## 2019-02-20 RX ORDER — OXYCODONE HYDROCHLORIDE 5 MG/1
30 TABLET ORAL EVERY 4 HOURS
Qty: 0 | Refills: 0 | Status: DISCONTINUED | OUTPATIENT
Start: 2019-02-20 | End: 2019-02-20

## 2019-02-20 RX ORDER — HYDROMORPHONE HYDROCHLORIDE 2 MG/ML
2 INJECTION INTRAMUSCULAR; INTRAVENOUS; SUBCUTANEOUS ONCE
Qty: 0 | Refills: 0 | Status: DISCONTINUED | OUTPATIENT
Start: 2019-02-20 | End: 2019-02-20

## 2019-02-20 RX ORDER — HYDROMORPHONE HYDROCHLORIDE 2 MG/ML
10 INJECTION INTRAMUSCULAR; INTRAVENOUS; SUBCUTANEOUS EVERY 4 HOURS
Qty: 0 | Refills: 0 | Status: DISCONTINUED | OUTPATIENT
Start: 2019-02-20 | End: 2019-02-22

## 2019-02-20 RX ORDER — NIFEDIPINE 30 MG
30 TABLET, EXTENDED RELEASE 24 HR ORAL ONCE
Qty: 0 | Refills: 0 | Status: COMPLETED | OUTPATIENT
Start: 2019-02-20 | End: 2019-02-20

## 2019-02-20 RX ADMIN — HYDROMORPHONE HYDROCHLORIDE 10 MILLIGRAM(S): 2 INJECTION INTRAMUSCULAR; INTRAVENOUS; SUBCUTANEOUS at 22:07

## 2019-02-20 RX ADMIN — HEPARIN SODIUM 5000 UNIT(S): 5000 INJECTION INTRAVENOUS; SUBCUTANEOUS at 13:15

## 2019-02-20 RX ADMIN — Medication 30 MILLIGRAM(S): at 06:12

## 2019-02-20 RX ADMIN — HEPARIN SODIUM 5000 UNIT(S): 5000 INJECTION INTRAVENOUS; SUBCUTANEOUS at 06:13

## 2019-02-20 RX ADMIN — AZITHROMYCIN 255 MILLIGRAM(S): 500 TABLET, FILM COATED ORAL at 12:06

## 2019-02-20 RX ADMIN — Medication 50 MILLIGRAM(S): at 06:12

## 2019-02-20 RX ADMIN — SODIUM CHLORIDE 100 MILLILITER(S): 9 INJECTION INTRAMUSCULAR; INTRAVENOUS; SUBCUTANEOUS at 01:45

## 2019-02-20 RX ADMIN — Medication 50 MILLIGRAM(S): at 10:37

## 2019-02-20 RX ADMIN — HYDROMORPHONE HYDROCHLORIDE 4 MILLIGRAM(S): 2 INJECTION INTRAMUSCULAR; INTRAVENOUS; SUBCUTANEOUS at 06:12

## 2019-02-20 RX ADMIN — HYDROMORPHONE HYDROCHLORIDE 4 MILLIGRAM(S): 2 INJECTION INTRAMUSCULAR; INTRAVENOUS; SUBCUTANEOUS at 11:16

## 2019-02-20 RX ADMIN — HYDROMORPHONE HYDROCHLORIDE 1 MILLIGRAM(S): 2 INJECTION INTRAMUSCULAR; INTRAVENOUS; SUBCUTANEOUS at 15:37

## 2019-02-20 RX ADMIN — HYDROMORPHONE HYDROCHLORIDE 10 MILLIGRAM(S): 2 INJECTION INTRAMUSCULAR; INTRAVENOUS; SUBCUTANEOUS at 17:24

## 2019-02-20 RX ADMIN — Medication 50 MILLIGRAM(S): at 17:22

## 2019-02-20 RX ADMIN — HYDROMORPHONE HYDROCHLORIDE 2 MILLIGRAM(S): 2 INJECTION INTRAMUSCULAR; INTRAVENOUS; SUBCUTANEOUS at 04:49

## 2019-02-20 RX ADMIN — HEPARIN SODIUM 5000 UNIT(S): 5000 INJECTION INTRAVENOUS; SUBCUTANEOUS at 22:03

## 2019-02-20 RX ADMIN — CEFTRIAXONE 100 GRAM(S): 500 INJECTION, POWDER, FOR SOLUTION INTRAMUSCULAR; INTRAVENOUS at 12:06

## 2019-02-20 RX ADMIN — Medication 50 MILLIGRAM(S): at 00:15

## 2019-02-20 RX ADMIN — HYDROMORPHONE HYDROCHLORIDE 4 MILLIGRAM(S): 2 INJECTION INTRAMUSCULAR; INTRAVENOUS; SUBCUTANEOUS at 01:44

## 2019-02-20 RX ADMIN — Medication 1 MILLIGRAM(S): at 12:06

## 2019-02-20 RX ADMIN — Medication 30 MILLIGRAM(S): at 10:37

## 2019-02-20 RX ADMIN — HYDROMORPHONE HYDROCHLORIDE 4 MILLIGRAM(S): 2 INJECTION INTRAMUSCULAR; INTRAVENOUS; SUBCUTANEOUS at 10:37

## 2019-02-20 NOTE — PROGRESS NOTE ADULT - SUBJECTIVE AND OBJECTIVE BOX
Nephrology progress note    Patient is seen and examined in the AM, events over the last 24 h noted .  C/o difficulty urinating    Allergies:  No Known Allergies    Hospital Medications:   MEDICATIONS  (STANDING):  azithromycin  IVPB      azithromycin  IVPB 500 milliGRAM(s) IV Intermittent every 24 hours  cefTRIAXone   IVPB 1 Gram(s) IV Intermittent every 24 hours  chlorhexidine 4% Liquid 1 Application(s) Topical <User Schedule>  diphenhydrAMINE   Injectable 50 milliGRAM(s) IV Push every 6 hours  folic acid 1 milliGRAM(s) Oral daily  heparin  Injectable 5000 Unit(s) SubCutaneous every 8 hours  sodium chloride 0.9%. 1000 milliLiter(s) (100 mL/Hr) IV Continuous <Continuous>        VITALS:  T(F): 97.9 (19 @ 13:55), Max: 97.9 (19 @ 13:55)  HR: 99 (19 @ 13:55)  BP: 171/100 (19 @ 13:55)  RR: 20 (19 @ 13:55)  SpO2: --  Wt(kg): --     @ 07:01  -   @ 07:00  --------------------------------------------------------  IN: 0 mL / OUT: 800 mL / NET: -800 mL     @ 07:01  -   @ 07:00  --------------------------------------------------------  IN: 0 mL / OUT: 2450 mL / NET: -2450 mL          PHYSICAL EXAM:  Constitutional: NAD  HEENT: anicteric sclera, oropharynx clear, MMM  Neck: No JVD  Respiratory: CTAB, no wheezes, rales or rhonchi  Cardiovascular: S1, S2, RRR  Gastrointestinal: BS+, soft, NT/ND  Extremities: No peripheral edema  Neurological: A/O x 3  : No CVA tenderness. No carreon.   Skin: No rashes  Vascular Access:    LABS:      137  |  103  |  38<H>  ----------------------------<  82  5.2<H>   |  17  |  2.6<H>  Creatinine Trend: 2.6<--, 2.7<--, 2.8<--, 3.0<--, 3.4<--      Ca    8.6      2019 06:56  Phos  4.8       Mg     1.6         TPro  6.8  /  Alb  3.2<L>  /  TBili  1.6<H>  /  DBili      /  AST  87<H>  /  ALT  36  /  AlkPhos  140<H>                            6.9    11.55 )-----------( 258      ( 2019 06:56 )             19.3       Urine Studies:  Urinalysis Basic - ( 2019 22:24 )    Color: Yellow / Appearance: Clear / S.015 / pH:   Gluc:  / Ketone: Negative  / Bili: Small / Urobili: 1.0 mg/dL   Blood:  / Protein: >=300 mg/dL / Nitrite: Negative   Leuk Esterase: Negative / RBC: 3-5 /HPF / WBC    Sq Epi:  / Non Sq Epi: Few /HPF / Bacteria: Few /HPF      Sodium, Random Urine: 52.0 mmoL/L ( @ 10:30)  Chloride, Random Urine: 44 ( @ 10:30)  Potassium, Random Urine: 31 mmol/L ( @ 10:30)  Creatinine, Random Urine: 81 mg/dL ( @ 10:30)    RADIOLOGY & ADDITIONAL STUDIES:  < from: Xray Chest 1 View- PORTABLE-Routine (19 @ 06:16) >  Stable bilateral opacities.    Stable cardiomegaly.    < end of copied text >  < from: US Retroperitoneal Complete (19 @ 11:52) >    Increased echogenicity of the bilateral kidneys compatible with medical   renal disease.    < end of copied text >

## 2019-02-20 NOTE — PROGRESS NOTE ADULT - ASSESSMENT
KARLA MONROY 38y Male  MRN#: [id number]   CODE STATUS:________      SUBJECTIVE  Patient is a 38y old Male who presents with a chief complaint of sickle cell pain crisis (19 Feb 2019 08:04)  Currently admitted to medicine with the primary diagnosis of Sickle cell anemia  Hospital course has been complicated by _______.   Today is hospital day 3d, and this morning he is _________ and reports ________ overnight events.     Present Today:           [place]anton Catheter ()No/ ()Yes? Indication:          Central Line ()No/ ()Yes? Indication:          IV Fluids ()No/ ()Yes? Type:  Rate:  Indication:      OBJECTIVE  PAST MEDICAL & SURGICAL HISTORY  Sickle cell anemia    ALLERGIES:  No Known Allergies    MEDICATIONS:  STANDING MEDICATIONS  azithromycin  IVPB      azithromycin  IVPB 500 milliGRAM(s) IV Intermittent every 24 hours  cefTRIAXone   IVPB 1 Gram(s) IV Intermittent every 24 hours  chlorhexidine 4% Liquid 1 Application(s) Topical <User Schedule>  diphenhydrAMINE   Injectable 50 milliGRAM(s) IV Push every 6 hours  folic acid 1 milliGRAM(s) Oral daily  heparin  Injectable 5000 Unit(s) SubCutaneous every 8 hours  NIFEdipine XL 30 milliGRAM(s) Oral once  sodium chloride 0.9%. 1000 milliLiter(s) IV Continuous <Continuous>    PRN MEDICATIONS  acetaminophen   Tablet .. 650 milliGRAM(s) Oral every 6 hours PRN  HYDROmorphone  Injectable 4 milliGRAM(s) IV Push every 4 hours PRN      VITAL SIGNS: Last 24 Hours  T(C): 35 (20 Feb 2019 06:50), Max: 36.5 (19 Feb 2019 13:57)  T(F): 95 (20 Feb 2019 06:50), Max: 97.7 (19 Feb 2019 13:57)  HR: 107 (20 Feb 2019 06:50) (71 - 107)  BP: 184/109 (20 Feb 2019 06:50) (165/95 - 184/109)  BP(mean): --  RR: 20 (20 Feb 2019 06:50) (18 - 20)  SpO2: --    LABS:                        6.9    11.55 )-----------( 258      ( 20 Feb 2019 06:56 )             19.3     02-19    139  |  109  |  42<H>  ----------------------------<  98  4.6   |  17  |  2.7<H>    Ca    8.8      19 Feb 2019 05:51  Phos  4.8     02-19  Mg     1.6     02-19    TPro  6.6  /  Alb  3.2<L>  /  TBili  1.7<H>  /  DBili  x   /  AST  85<H>  /  ALT  41  /  AlkPhos  143<H>  02-19              Culture - Stool (collected 17 Feb 2019 19:52)  Source: .Stool Feces  Final Report (19 Feb 2019 17:54):    No enteric pathogens isolated.    (Stool culture examined for Salmonella,    Shigella, Campylobacter, Aeromonas, Plesiomonas,    Vibrio, E.coli O157 and Yersinia)    No enteric gram negative rods isolated      CARDIAC MARKERS ( 19 Feb 2019 05:51 )  x     / 0.02 ng/mL / 39 U/L / x     / 1.0 ng/mL  CARDIAC MARKERS ( 18 Feb 2019 20:05 )  x     / 0.02 ng/mL / 54 U/L / x     / 1.3 ng/mL      RADIOLOGY:      PHYSICAL EXAM:    GENERAL: NAD, well-developed, AAOx3, not in distress   HEENT:  Atraumatic, Normocephalic.   PULMONARY: Clear to auscultation bilaterally; No wheeze  CARDIOVASCULAR: Regular rate and rhythm.  GASTROINTESTINAL: Soft, Nontender, Nondistended.  MUSCULOSKELETAL: No clubbing, cyanosis, or edema  NEUROLOGY: non-focal  SKIN: No rashes or lesions    ASSESSMENT & PLAN  38 M w/ sickle cell disease c/o parasites crawling on him, found to have hgb 5.5, being admitted for sickle cell vasoocclusive pain crisis .     # Vasoocclusive sickle cell disease pain/? acute chest syndrome   - hbg dropped to 6.5 today   - s/p 2u prbc on 2/17, will transfuse one unite   - O2 sat 98 on room air, improving   - stable bilateral opacities on CXR,   - v/q scan is negative,   - CE is negative and no acute ischemic changes in EKG  - C/W IV hydration, NS @ 100 cc,  - on ceftriaxone and azithromycin   - Hydromorphone 4mg IV q4 prn for pain, will reassess later today.  - c/w folic acid  - Incentive spirometry, and DVT ppx   - Hematology on board     # Likely KAMRAN on CKD 3 vs CKD   - Cr improving 3 > 2.7   - proteuria, no urine RBC with positive dipstick due to hemolysis, CK WNL  - c/w IVF   - nephro on board     # Uncontrolled HTN   - will increase Nifedipine ER to 60 QD. extra 30 mg today.      # Sensation of parasites  - resolved   - will consider Psych consult     # DVTppx: Lovenox  # dispo: needs cm/  for placement KARLA MONROY 38y Male  MRN#: [id number]   CODE STATUS:FULLCODE    SUBJECTIVE  Patient is a 38y old Male who presents with a chief complaint of sickle cell pain crisis (19 Feb 2019 08:04)  Currently admitted to medicine with the primary diagnosis of Sickle cell anemia Vasoocclusive crisis   Today is hospital day 3d, and this morning he is resting comfortably in bed and reports no overnight events.   States that pain is resolving with pain medications, but still asking for more pain medications with benadryl.   Denies Chest pain or SOB, states that he is breathing comfortably on room air    OBJECTIVE  PAST MEDICAL & SURGICAL HISTORY  Sickle cell anemia    ALLERGIES:  No Known Allergies    MEDICATIONS:  STANDING MEDICATIONS  azithromycin  IVPB      azithromycin  IVPB 500 milliGRAM(s) IV Intermittent every 24 hours  cefTRIAXone   IVPB 1 Gram(s) IV Intermittent every 24 hours  chlorhexidine 4% Liquid 1 Application(s) Topical <User Schedule>  diphenhydrAMINE   Injectable 50 milliGRAM(s) IV Push every 6 hours  folic acid 1 milliGRAM(s) Oral daily  heparin  Injectable 5000 Unit(s) SubCutaneous every 8 hours  NIFEdipine XL 30 milliGRAM(s) Oral once  sodium chloride 0.9%. 1000 milliLiter(s) IV Continuous <Continuous>    PRN MEDICATIONS  acetaminophen   Tablet .. 650 milliGRAM(s) Oral every 6 hours PRN  HYDROmorphone  Injectable 4 milliGRAM(s) IV Push every 4 hours PRN      VITAL SIGNS: Last 24 Hours  T(C): 35 (20 Feb 2019 06:50), Max: 36.5 (19 Feb 2019 13:57)  T(F): 95 (20 Feb 2019 06:50), Max: 97.7 (19 Feb 2019 13:57)  HR: 107 (20 Feb 2019 06:50) (71 - 107)  BP: 184/109 (20 Feb 2019 06:50) (165/95 - 184/109)  BP(mean): --  RR: 20 (20 Feb 2019 06:50) (18 - 20)  SpO2: --    LABS:                        6.9    11.55 )-----------( 258      ( 20 Feb 2019 06:56 )             19.3     02-19    139  |  109  |  42<H>  ----------------------------<  98  4.6   |  17  |  2.7<H>    Ca    8.8      19 Feb 2019 05:51  Phos  4.8     02-19  Mg     1.6     02-19    TPro  6.6  /  Alb  3.2<L>  /  TBili  1.7<H>  /  DBili  x   /  AST  85<H>  /  ALT  41  /  AlkPhos  143<H>  02-19    Culture - Stool (collected 17 Feb 2019 19:52)  Source: .Stool Feces  Final Report (19 Feb 2019 17:54):    No enteric pathogens isolated.    (Stool culture examined for Salmonella,    Shigella, Campylobacter, Aeromonas, Plesiomonas,    Vibrio, E.coli O157 and Yersinia)    No enteric gram negative rods isolated      CARDIAC MARKERS ( 19 Feb 2019 05:51 )  x     / 0.02 ng/mL / 39 U/L / x     / 1.0 ng/mL  CARDIAC MARKERS ( 18 Feb 2019 20:05 )  x     / 0.02 ng/mL / 54 U/L / x     / 1.3 ng/mL      RADIOLOGY:      PHYSICAL EXAM:    GENERAL: NAD, well-developed, AAOx3, not in distress   HEENT:  Atraumatic, Normocephalic.   PULMONARY: Clear to auscultation bilaterally; No wheeze  CARDIOVASCULAR: Regular rate and rhythm.  GASTROINTESTINAL: Soft, Nontender, Nondistended.  MUSCULOSKELETAL: No clubbing, cyanosis, or edema  NEUROLOGY: non-focal  SKIN: No rashes or lesions    ASSESSMENT & PLAN  38 M w/ sickle cell disease c/o parasites crawling on him, found to have hgb 5.5, being admitted for sickle cell vasoocclusive pain crisis .     # Vasoocclusive sickle cell disease pain/? acute chest syndrome   - hbg dropped to 6.5 today   - s/p 2u prbc on 2/17, will transfuse one unite   - O2 sat 98 on room air, improving   - stable bilateral opacities on CXR,   - v/q scan is negative,   - CE is negative and no acute ischemic changes in EKG  - C/W IV hydration, NS @ 100 cc,  - on ceftriaxone and azithromycin   - Hydromorphone 4mg IV q4 prn for pain, will reassess later today.  - c/w folic acid  - Incentive spirometry, and DVT ppx   - Hematology on board     # Likely KAMRAN on CKD 3 vs CKD   - Cr improving 3 > 2.7 > 2.6  - proteuria, no urine RBC with positive dipstick due to hemolysis, CK WNL  - c/w IVF   - nephro on board     # Uncontrolled HTN   - will increase Nifedipine ER to 60 QD. add  30 mg one dose today.      # Sensation of parasites  - resolved   - will consider Psych consult     # DVTppx: Lovenox  # dispo: needs cm/  for placement KARLA MONROY 38y Male  MRN#: [id number]   CODE STATUS:FULLCODE    SUBJECTIVE  Patient is a 38y old Male who presents with a chief complaint of sickle cell pain crisis (19 Feb 2019 08:04)  Currently admitted to medicine with the primary diagnosis of Sickle cell anemia Vasoocclusive crisis   Today is hospital day 3d, and this morning he is resting comfortably in bed and reports no overnight events.   States that pain is resolving with pain medications, but still asking for more pain medications with benadryl.   Denies Chest pain or SOB, states that he is breathing comfortably on room air    OBJECTIVE  PAST MEDICAL & SURGICAL HISTORY  Sickle cell anemia    ALLERGIES:  No Known Allergies    MEDICATIONS:  STANDING MEDICATIONS  azithromycin  IVPB      azithromycin  IVPB 500 milliGRAM(s) IV Intermittent every 24 hours  cefTRIAXone   IVPB 1 Gram(s) IV Intermittent every 24 hours  chlorhexidine 4% Liquid 1 Application(s) Topical <User Schedule>  diphenhydrAMINE   Injectable 50 milliGRAM(s) IV Push every 6 hours  folic acid 1 milliGRAM(s) Oral daily  heparin  Injectable 5000 Unit(s) SubCutaneous every 8 hours  NIFEdipine XL 30 milliGRAM(s) Oral once  sodium chloride 0.9%. 1000 milliLiter(s) IV Continuous <Continuous>    PRN MEDICATIONS  acetaminophen   Tablet .. 650 milliGRAM(s) Oral every 6 hours PRN  HYDROmorphone  Injectable 4 milliGRAM(s) IV Push every 4 hours PRN      VITAL SIGNS: Last 24 Hours  T(C): 35 (20 Feb 2019 06:50), Max: 36.5 (19 Feb 2019 13:57)  T(F): 95 (20 Feb 2019 06:50), Max: 97.7 (19 Feb 2019 13:57)  HR: 107 (20 Feb 2019 06:50) (71 - 107)  BP: 184/109 (20 Feb 2019 06:50) (165/95 - 184/109)  BP(mean): --  RR: 20 (20 Feb 2019 06:50) (18 - 20)  SpO2: --    LABS:                        6.9    11.55 )-----------( 258      ( 20 Feb 2019 06:56 )             19.3     02-19    139  |  109  |  42<H>  ----------------------------<  98  4.6   |  17  |  2.7<H>    Ca    8.8      19 Feb 2019 05:51  Phos  4.8     02-19  Mg     1.6     02-19    TPro  6.6  /  Alb  3.2<L>  /  TBili  1.7<H>  /  DBili  x   /  AST  85<H>  /  ALT  41  /  AlkPhos  143<H>  02-19    Culture - Stool (collected 17 Feb 2019 19:52)  Source: .Stool Feces  Final Report (19 Feb 2019 17:54):    No enteric pathogens isolated.    (Stool culture examined for Salmonella,    Shigella, Campylobacter, Aeromonas, Plesiomonas,    Vibrio, E.coli O157 and Yersinia)    No enteric gram negative rods isolated      CARDIAC MARKERS ( 19 Feb 2019 05:51 )  x     / 0.02 ng/mL / 39 U/L / x     / 1.0 ng/mL  CARDIAC MARKERS ( 18 Feb 2019 20:05 )  x     / 0.02 ng/mL / 54 U/L / x     / 1.3 ng/mL      RADIOLOGY:      PHYSICAL EXAM:    GENERAL: NAD, well-developed, AAOx3, not in distress   HEENT:  Atraumatic, Normocephalic.   PULMONARY: Clear to auscultation bilaterally; No wheeze  CARDIOVASCULAR: Regular rate and rhythm.  GASTROINTESTINAL: Soft, Nontender, Nondistended.  MUSCULOSKELETAL: No clubbing, cyanosis, or edema  NEUROLOGY: non-focal  SKIN: No rashes or lesions    ASSESSMENT & PLAN  38 M w/ sickle cell disease c/o parasites crawling on him, found to have hgb 5.5, being admitted for sickle cell vasoocclusive pain crisis .     # Vasoocclusive sickle cell disease pain/? acute chest syndrome   - hbg dropped to 6.5 today   - s/p 2u prbc on 2/17, will transfuse one unite   - O2 sat 98 on room air, improving   - stable bilateral opacities on CXR,   - v/q scan is negative,   - CE is negative and no acute ischemic changes in EKG  - C/W IV hydration, NS @ 100 cc,  - on ceftriaxone and azithromycin   - s/p Hydromorphone 4mg IV q4, will switch to Oxycodone   - c/w folic acid  - Incentive spirometry, and DVT ppx   - Hematology on board     # Likely KAMRAN on CKD 3 vs CKD   - Cr improving 3 > 2.7 > 2.6  - proteuria, no urine RBC with positive dipstick due to hemolysis, CK WNL  - c/w IVF   - nephro on board     # Uncontrolled HTN   - will increase Nifedipine ER to 60 QD. add  30 mg one dose today.      # Sensation of parasites  - resolved   - will consider Psych consult     # DVTppx: Lovenox  # dispo: needs cm/  for placement KALRA MONROY 38y Male  MRN#: [id number]   CODE STATUS:FULLCODE    SUBJECTIVE  Patient is a 38y old Male who presents with a chief complaint of sickle cell pain crisis (19 Feb 2019 08:04)  Currently admitted to medicine with the primary diagnosis of Sickle cell anemia Vasoocclusive crisis   Today is hospital day 3d, and this morning he is resting comfortably in bed and reports no overnight events.   States that pain is resolving with pain medications, but still asking for more pain medications with benadryl.   Denies Chest pain or SOB, states that he is breathing comfortably on room air    OBJECTIVE  PAST MEDICAL & SURGICAL HISTORY  Sickle cell anemia    ALLERGIES:  No Known Allergies    MEDICATIONS:  STANDING MEDICATIONS  azithromycin  IVPB      azithromycin  IVPB 500 milliGRAM(s) IV Intermittent every 24 hours  cefTRIAXone   IVPB 1 Gram(s) IV Intermittent every 24 hours  chlorhexidine 4% Liquid 1 Application(s) Topical <User Schedule>  diphenhydrAMINE   Injectable 50 milliGRAM(s) IV Push every 6 hours  folic acid 1 milliGRAM(s) Oral daily  heparin  Injectable 5000 Unit(s) SubCutaneous every 8 hours  NIFEdipine XL 30 milliGRAM(s) Oral once  sodium chloride 0.9%. 1000 milliLiter(s) IV Continuous <Continuous>    PRN MEDICATIONS  acetaminophen   Tablet .. 650 milliGRAM(s) Oral every 6 hours PRN  HYDROmorphone  Injectable 4 milliGRAM(s) IV Push every 4 hours PRN      VITAL SIGNS: Last 24 Hours  T(C): 35 (20 Feb 2019 06:50), Max: 36.5 (19 Feb 2019 13:57)  T(F): 95 (20 Feb 2019 06:50), Max: 97.7 (19 Feb 2019 13:57)  HR: 107 (20 Feb 2019 06:50) (71 - 107)  BP: 184/109 (20 Feb 2019 06:50) (165/95 - 184/109)  BP(mean): --  RR: 20 (20 Feb 2019 06:50) (18 - 20)  SpO2: --    LABS:                        6.9    11.55 )-----------( 258      ( 20 Feb 2019 06:56 )             19.3     02-19    139  |  109  |  42<H>  ----------------------------<  98  4.6   |  17  |  2.7<H>    Ca    8.8      19 Feb 2019 05:51  Phos  4.8     02-19  Mg     1.6     02-19    TPro  6.6  /  Alb  3.2<L>  /  TBili  1.7<H>  /  DBili  x   /  AST  85<H>  /  ALT  41  /  AlkPhos  143<H>  02-19    Culture - Stool (collected 17 Feb 2019 19:52)  Source: .Stool Feces  Final Report (19 Feb 2019 17:54):    No enteric pathogens isolated.    (Stool culture examined for Salmonella,    Shigella, Campylobacter, Aeromonas, Plesiomonas,    Vibrio, E.coli O157 and Yersinia)    No enteric gram negative rods isolated      CARDIAC MARKERS ( 19 Feb 2019 05:51 )  x     / 0.02 ng/mL / 39 U/L / x     / 1.0 ng/mL  CARDIAC MARKERS ( 18 Feb 2019 20:05 )  x     / 0.02 ng/mL / 54 U/L / x     / 1.3 ng/mL      RADIOLOGY:      PHYSICAL EXAM:    GENERAL: NAD, well-developed, AAOx3, not in distress   HEENT:  Atraumatic, Normocephalic.   PULMONARY: Clear to auscultation bilaterally; No wheeze  CARDIOVASCULAR: Regular rate and rhythm.  GASTROINTESTINAL: Soft, Nontender, Nondistended.  MUSCULOSKELETAL: No clubbing, cyanosis, or edema  NEUROLOGY: non-focal  SKIN: No rashes or lesions    ASSESSMENT & PLAN  38 M w/ sickle cell disease c/o parasites crawling on him, found to have hgb 5.5, being admitted for sickle cell vasoocclusive pain crisis .     # Vasoocclusive sickle cell disease pain with Sickle cell crisis.   No acute chest syndrome . Ruled out    - hbg dropped to 6.5 today   - s/p 2u prbc on 2/17, will transfuse one unite   - O2 sat 98 on room air, improving   - stable bilateral opacities on CXR,   - v/q scan is negative,   - CE is negative and no acute ischemic changes in EKG  - C/W IV hydration, NS @ 100 cc,  - on ceftriaxone and azithromycin   - s/p Hydromorphone 4mg IV q4, will switch to Oxycodone   - c/w folic acid  - Incentive spirometry, and DVT ppx   - Hematology on board     # Likely KAMRAN on CKD 3 vs CKD   - Cr improving 3 > 2.7 > 2.6  - proteuria, no urine RBC with positive dipstick due to hemolysis, CK WNL  - c/w IVF   - nephro on board     # Uncontrolled HTN   - will increase Nifedipine ER to 60 QD. add  30 mg one dose today.      # Sensation of parasites  - resolved   - will consider Psych consult     # DVTppx: Lovenox  # dispo: needs cm/  for placement    Change Pain meds to oral today and IM PRN from the breakthrough pain. KARLA MONROY 38y Male  MRN#: [id number]   CODE STATUS:FULLCODE    SUBJECTIVE  Patient is a 38y old Male who presents with a chief complaint of sickle cell pain crisis (19 Feb 2019 08:04)  Currently admitted to medicine with the primary diagnosis of Sickle cell anemia Vasoocclusive crisis   Today is hospital day 3d, and this morning he is resting comfortably in bed and reports no overnight events.   States that pain is resolving with pain medications, but still asking for more pain medications with benadryl.   Denies Chest pain or SOB, states that he is breathing comfortably on room air    OBJECTIVE  PAST MEDICAL & SURGICAL HISTORY  Sickle cell anemia    ALLERGIES:  No Known Allergies    MEDICATIONS:  STANDING MEDICATIONS  azithromycin  IVPB      azithromycin  IVPB 500 milliGRAM(s) IV Intermittent every 24 hours  cefTRIAXone   IVPB 1 Gram(s) IV Intermittent every 24 hours  chlorhexidine 4% Liquid 1 Application(s) Topical <User Schedule>  diphenhydrAMINE   Injectable 50 milliGRAM(s) IV Push every 6 hours  folic acid 1 milliGRAM(s) Oral daily  heparin  Injectable 5000 Unit(s) SubCutaneous every 8 hours  NIFEdipine XL 30 milliGRAM(s) Oral once  sodium chloride 0.9%. 1000 milliLiter(s) IV Continuous <Continuous>    PRN MEDICATIONS  acetaminophen   Tablet .. 650 milliGRAM(s) Oral every 6 hours PRN  HYDROmorphone  Injectable 4 milliGRAM(s) IV Push every 4 hours PRN      VITAL SIGNS: Last 24 Hours  T(C): 35 (20 Feb 2019 06:50), Max: 36.5 (19 Feb 2019 13:57)  T(F): 95 (20 Feb 2019 06:50), Max: 97.7 (19 Feb 2019 13:57)  HR: 107 (20 Feb 2019 06:50) (71 - 107)  BP: 184/109 (20 Feb 2019 06:50) (165/95 - 184/109)  BP(mean): --  RR: 20 (20 Feb 2019 06:50) (18 - 20)  SpO2: --    LABS:                        6.9    11.55 )-----------( 258      ( 20 Feb 2019 06:56 )             19.3     02-19    139  |  109  |  42<H>  ----------------------------<  98  4.6   |  17  |  2.7<H>    Ca    8.8      19 Feb 2019 05:51  Phos  4.8     02-19  Mg     1.6     02-19    TPro  6.6  /  Alb  3.2<L>  /  TBili  1.7<H>  /  DBili  x   /  AST  85<H>  /  ALT  41  /  AlkPhos  143<H>  02-19    Culture - Stool (collected 17 Feb 2019 19:52)  Source: .Stool Feces  Final Report (19 Feb 2019 17:54):    No enteric pathogens isolated.    (Stool culture examined for Salmonella,    Shigella, Campylobacter, Aeromonas, Plesiomonas,    Vibrio, E.coli O157 and Yersinia)    No enteric gram negative rods isolated      CARDIAC MARKERS ( 19 Feb 2019 05:51 )  x     / 0.02 ng/mL / 39 U/L / x     / 1.0 ng/mL  CARDIAC MARKERS ( 18 Feb 2019 20:05 )  x     / 0.02 ng/mL / 54 U/L / x     / 1.3 ng/mL      RADIOLOGY:      PHYSICAL EXAM:    GENERAL: NAD, well-developed, AAOx3, not in distress   HEENT:  Atraumatic, Normocephalic.   PULMONARY: Clear to auscultation bilaterally; No wheeze  CARDIOVASCULAR: Regular rate and rhythm.  GASTROINTESTINAL: Soft, Nontender, Nondistended.  MUSCULOSKELETAL: No clubbing, cyanosis, or edema  NEUROLOGY: non-focal  SKIN: No rashes or lesions    ASSESSMENT & PLAN  38 M w/ sickle cell disease c/o parasites crawling on him, found to have hgb 5.5, being admitted for sickle cell vasoocclusive pain crisis .     # Vasoocclusive sickle cell disease pain with Sickle cell crisis.   No acute chest syndrome . Ruled out    - hbg dropped to 6.5 today   - s/p 2u prbc on 2/17, will transfuse one unite   - O2 sat 98 on room air, improving   - stable bilateral opacities on CXR,   - v/q scan is negative,   - CE is negative and no acute ischemic changes in EKG  - C/W IV hydration, NS @ 100 cc,  - on ceftriaxone and azithromycin   - s/p Hydromorphone 4mg IV q4, will switch to Oxycodone   - c/w folic acid  - Incentive spirometry, and DVT ppx   - Hematology on board     # Likely KAMRAN on CKD 3 vs CKD   - Cr improving 3 > 2.7 > 2.6  - proteuria, no urine RBC with positive dipstick due to hemolysis, CK WNL  - c/w IVF   - nephro on board     # Uncontrolled HTN   - will increase Nifedipine ER to 60 QD. add  30 mg one dose today.      # Sensation of parasites  - resolved   - will consider Psych consult     # Folic acid deficiency - Gets supplements , Sickle cell patient with expected cell breakdown     # DVTppx: Lovenox  # dispo: needs cm/  for placement    Change Pain meds to oral today and IM PRN from the breakthrough pain.

## 2019-02-20 NOTE — PROGRESS NOTE ADULT - ASSESSMENT
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 , rhabdo  ruled out   Proteinuria>300, suggestive of glomerular type injury (MPGN, FSGS may be seen in SCD)- check protein/creat ratio  - need to r/o other etiologies - check HIV, JERICHO DNA c3c4, hep B, C, SPEP/UPEP, K/l ratio  urine non oliguric, unknown baseline creat  - Sono noted no hydro, increased echogenicity c/w CKD, check Bladder sono for PVR (has diff voiding)  -continue IVF with NS  - creat has been stable , stict Is and Os  -monitor IP, check iPTH    Hyperkalemia - 2 gr K diet, repeat  HAG MEt Acidosis - add Na bicarb 650 tid po    HTN - BP has been high  - start Nifedipine Er 30 mg po qd    -monitor I/O  - will follow

## 2019-02-20 NOTE — PROGRESS NOTE ADULT - ASSESSMENT
38 yom with PMH of sickle cell disease, on hydroxyurea only (does not recall who his hematologist was), says baseline hgb of 7, presents with c/o nausea and "parasites" on skin and stool.  In the ED, the patient had a Hgb of 5.5, reticulocyte 4.5%, creatinine of 3.4. (No previous creatinine available for comparison.)  Patient responded appropriately to 2 units of PRBCs, with hemoglobin of 7.5.  Hematology consulted for concern of acute chest syndrome.    1. Sickle cell disease with concern for acute chest syndrome-Recommend daily CXRs and careful monitoring of oxygen saturation/respiratory status.  Patient states that he has never had an exchange transfusion before and does not feel he needs one now.  S/p two units of PRBCs on admission.  To receive one unit of PRBCs today.  Hemoglobin electrophoresis is pending.  Continue folic acid supplementation.    Patient will benefit from close outpatient hematology follow up. 38 yom with PMH of sickle cell disease, on hydroxyurea only (does not recall who his hematologist was), says baseline hgb of 7, presents with c/o nausea and "parasites" on skin and stool.  In the ED, the patient had a Hgb of 5.5, reticulocyte 4.5%, creatinine of 3.4. (No previous creatinine available for comparison.)  Patient responded appropriately to 2 units of PRBCs, with hemoglobin of 7.5.  Hematology consulted for concern of acute chest syndrome.    1. Sickle cell disease with concern for acute chest syndrome-Recommend daily CXRs and careful monitoring of oxygen saturation/respiratory status.  This AM, patient was saturating 94% on RA and not in any respiratory distress.  Patient states that he has never had an exchange transfusion before and does not feel he needs one now.  S/p two units of PRBCs on admission.  To receive one unit of PRBCs today.  Hemoglobin electrophoresis is pending.  Continue pain control and folic acid supplementation.  May add hydroxyurea at later date, depending on renal function.    Patient will benefit from close outpatient hematology follow up.

## 2019-02-20 NOTE — PROGRESS NOTE ADULT - SUBJECTIVE AND OBJECTIVE BOX
Patient is a 38y old  Male who presents with a chief complaint of sickle cell pain crisis (19 Feb 2019 08:04)      Subjective:      Vital Signs Last 24 Hrs  T(C): 35 (20 Feb 2019 06:50), Max: 36.5 (19 Feb 2019 13:57)  T(F): 95 (20 Feb 2019 06:50), Max: 97.7 (19 Feb 2019 13:57)  HR: 107 (20 Feb 2019 06:50) (71 - 107)  BP: 184/109 (20 Feb 2019 06:50) (165/95 - 184/109)  BP(mean): --  RR: 20 (20 Feb 2019 06:50) (18 - 20)  SpO2: --    PHYSICAL EXAM  General: adult in NAD  HEENT: clear oropharynx, anicteric sclera, pink conjunctiva  Neck: supple  CV: normal S1/S2 with no murmur rubs or gallops  Lungs: positive air movement b/l ant lungs,clear to auscultation, no wheezes, no rales  Abdomen: soft non-tender non-distended, no hepatosplenomegaly  Ext: no clubbing cyanosis or edema  Skin: no rashes and no petechiae  Neuro: alert and oriented X 4, no focal deficits    MEDICATIONS  (STANDING):  azithromycin  IVPB      azithromycin  IVPB 500 milliGRAM(s) IV Intermittent every 24 hours  cefTRIAXone   IVPB 1 Gram(s) IV Intermittent every 24 hours  chlorhexidine 4% Liquid 1 Application(s) Topical <User Schedule>  diphenhydrAMINE   Injectable 50 milliGRAM(s) IV Push every 6 hours  folic acid 1 milliGRAM(s) Oral daily  heparin  Injectable 5000 Unit(s) SubCutaneous every 8 hours  NIFEdipine XL 30 milliGRAM(s) Oral once  sodium chloride 0.9%. 1000 milliLiter(s) (100 mL/Hr) IV Continuous <Continuous>    MEDICATIONS  (PRN):  acetaminophen   Tablet .. 650 milliGRAM(s) Oral every 6 hours PRN Mild Pain (1 - 3)  HYDROmorphone  Injectable 4 milliGRAM(s) IV Push every 4 hours PRN Severe Pain (7 - 10)      LABS:                          6.5    12.28 )-----------( 273      ( 20 Feb 2019 03:11 )             18.6         Mean Cell Volume : 86.9 fL  Mean Cell Hemoglobin : 30.4 pg  Mean Cell Hemoglobin Concentration : 34.9 g/dL  Auto Neutrophil # : x  Auto Lymphocyte # : x  Auto Monocyte # : x  Auto Eosinophil # : x  Auto Basophil # : x  Auto Neutrophil % : x  Auto Lymphocyte % : x  Auto Monocyte % : x  Auto Eosinophil % : x  Auto Basophil % : x      Serial CBC's  02-20 @ 03:11  Hct-18.6 / Hgb-6.5 / Plat-273 / RBC-2.14 / WBC-12.28  Serial CBC's  02-19 @ 05:51  Hct-19.3 / Hgb-6.9 / Plat-311 / RBC-2.25 / WBC-10.81  Serial CBC's  02-18 @ 20:05  Hct-19.6 / Hgb-7.2 / Plat-307 / RBC-2.30 / WBC-11.04  Serial CBC's  02-17 @ 19:12  Hct-20.4 / Hgb-7.5 / Plat-317 / RBC-2.45 / WBC-11.24  Serial CBC's  02-16 @ 22:24  Hct--- / Hgb--- / Plat--- / RBC-1.67 / WBC---  Serial CBC's  02-16 @ 21:38  Hct-15.0 / Hgb-5.5 / Plat-297 / RBC-1.79 / WBC-12.61      02-19    139  |  109  |  42<H>  ----------------------------<  98  4.6   |  17  |  2.7<H>    Ca    8.8      19 Feb 2019 05:51  Phos  4.8     02-19  Mg     1.6     02-19    TPro  6.6  /  Alb  3.2<L>  /  TBili  1.7<H>  /  DBili  x   /  AST  85<H>  /  ALT  41  /  AlkPhos  143<H>  02-19          Reticulocyte Percent: 20.9 % (02-19 @ 05:51)  Reticulocyte Percent: 19.7 % (02-18 @ 20:05)  Reticulocyte Percent: 4.5 % (02-16 @ 22:24)      Serum Protein Electrophoresis Interp: Normal Electrophoresis Pattern (02-18 @ 20:05)  Immunofixation, Serum:   No Monoclonal Band Identified    Reference Range: None Detected (02-18 @ 20:05)    Culture - Stool (collected 17 Feb 2019 19:52)  Source: .Stool Feces  Final Report (19 Feb 2019 17:54):    No enteric pathogens isolated.    (Stool culture examined for Salmonella,    Shigella, Campylobacter, Aeromonas, Plesiomonas,    Vibrio, E.coli O157 and Yersinia)    No enteric gram negative rods isolated Patient is a 38y old  Male who presents with a chief complaint of sickle cell pain crisis (19 Feb 2019 08:04)    Subjective: Patient seen and examined.  Reports that he feels better and is denying shortness of breath.  Oxygen saturation was 94% on RA.  Only complaint is R lower rib pain.      Vital Signs Last 24 Hrs  T(C): 35 (20 Feb 2019 06:50), Max: 36.5 (19 Feb 2019 13:57)  T(F): 95 (20 Feb 2019 06:50), Max: 97.7 (19 Feb 2019 13:57)  HR: 107 (20 Feb 2019 06:50) (71 - 107)  BP: 184/109 (20 Feb 2019 06:50) (165/95 - 184/109)  BP(mean): --  RR: 20 (20 Feb 2019 06:50) (18 - 20)  SpO2: --    PHYSICAL EXAM  General: adult in NAD, well-appearing, oxygen saturation of 94% on RA  Neck: supple  CV: +S1, +S2  Lungs: positive air movement b/l ant lungs, CTA B/L  Ext: no edema  Skin: no rashes and no petechiae  Neuro: alert and oriented X 4, no focal deficits    MEDICATIONS  (STANDING):  azithromycin  IVPB      azithromycin  IVPB 500 milliGRAM(s) IV Intermittent every 24 hours  cefTRIAXone   IVPB 1 Gram(s) IV Intermittent every 24 hours  chlorhexidine 4% Liquid 1 Application(s) Topical <User Schedule>  diphenhydrAMINE   Injectable 50 milliGRAM(s) IV Push every 6 hours  folic acid 1 milliGRAM(s) Oral daily  heparin  Injectable 5000 Unit(s) SubCutaneous every 8 hours  NIFEdipine XL 30 milliGRAM(s) Oral once  sodium chloride 0.9%. 1000 milliLiter(s) (100 mL/Hr) IV Continuous <Continuous>    MEDICATIONS  (PRN):  acetaminophen   Tablet .. 650 milliGRAM(s) Oral every 6 hours PRN Mild Pain (1 - 3)  HYDROmorphone  Injectable 4 milliGRAM(s) IV Push every 4 hours PRN Severe Pain (7 - 10)      LABS:                          6.5    12.28 )-----------( 273      ( 20 Feb 2019 03:11 )             18.6         Mean Cell Volume : 86.9 fL  Mean Cell Hemoglobin : 30.4 pg  Mean Cell Hemoglobin Concentration : 34.9 g/dL  Auto Neutrophil # : x  Auto Lymphocyte # : x  Auto Monocyte # : x  Auto Eosinophil # : x  Auto Basophil # : x  Auto Neutrophil % : x  Auto Lymphocyte % : x  Auto Monocyte % : x  Auto Eosinophil % : x  Auto Basophil % : x      Serial CBC's  02-20 @ 03:11  Hct-18.6 / Hgb-6.5 / Plat-273 / RBC-2.14 / WBC-12.28  Serial CBC's  02-19 @ 05:51  Hct-19.3 / Hgb-6.9 / Plat-311 / RBC-2.25 / WBC-10.81  Serial CBC's  02-18 @ 20:05  Hct-19.6 / Hgb-7.2 / Plat-307 / RBC-2.30 / WBC-11.04  Serial CBC's  02-17 @ 19:12  Hct-20.4 / Hgb-7.5 / Plat-317 / RBC-2.45 / WBC-11.24  Serial CBC's  02-16 @ 22:24  Hct--- / Hgb--- / Plat--- / RBC-1.67 / WBC---  Serial CBC's  02-16 @ 21:38  Hct-15.0 / Hgb-5.5 / Plat-297 / RBC-1.79 / WBC-12.61      02-19    139  |  109  |  42<H>  ----------------------------<  98  4.6   |  17  |  2.7<H>    Ca    8.8      19 Feb 2019 05:51  Phos  4.8     02-19  Mg     1.6     02-19    TPro  6.6  /  Alb  3.2<L>  /  TBili  1.7<H>  /  DBili  x   /  AST  85<H>  /  ALT  41  /  AlkPhos  143<H>  02-19      Reticulocyte Percent: 20.9 % (02-19 @ 05:51)  Reticulocyte Percent: 19.7 % (02-18 @ 20:05)  Reticulocyte Percent: 4.5 % (02-16 @ 22:24)    Serum Protein Electrophoresis Interp: Normal Electrophoresis Pattern (02-18 @ 20:05)  Immunofixation, Serum:   No Monoclonal Band Identified    Reference Range: None Detected (02-18 @ 20:05)    Culture - Stool (collected 17 Feb 2019 19:52)  Source: .Stool Feces  Final Report (19 Feb 2019 17:54):    No enteric pathogens isolated.    (Stool culture examined for Salmonella,    Shigella, Campylobacter, Aeromonas, Plesiomonas,    Vibrio, E.coli O157 and Yersinia)    No enteric gram negative rods isolated Patient is a 38y old  Male who presents with a chief complaint of sickle cell pain crisis (19 Feb 2019 08:04)    Subjective: Patient seen and examined.  Reports that he feels better and is denying shortness of breath.  Oxygen saturation was 94% on RA.  Only complaint is R lower rib pain.      Vital Signs Last 24 Hrs  T(C): 35 (20 Feb 2019 06:50), Max: 36.5 (19 Feb 2019 13:57)  T(F): 95 (20 Feb 2019 06:50), Max: 97.7 (19 Feb 2019 13:57)  HR: 107 (20 Feb 2019 06:50) (71 - 107)  BP: 184/109 (20 Feb 2019 06:50) (165/95 - 184/109)  BP(mean): --  RR: 20 (20 Feb 2019 06:50) (18 - 20)  SpO2: --    PHYSICAL EXAM  General: adult in NAD, well-appearing, oxygen saturation of 94% on RA  Neck: supple  CV: +S1, +S2  Lungs: positive air movement b/l ant lungs, no wheezing  Ext: no edema  Skin: no rashes and no petechiae  Neuro: alert and oriented X 4, no focal deficits    MEDICATIONS  (STANDING):  azithromycin  IVPB      azithromycin  IVPB 500 milliGRAM(s) IV Intermittent every 24 hours  cefTRIAXone   IVPB 1 Gram(s) IV Intermittent every 24 hours  chlorhexidine 4% Liquid 1 Application(s) Topical <User Schedule>  diphenhydrAMINE   Injectable 50 milliGRAM(s) IV Push every 6 hours  folic acid 1 milliGRAM(s) Oral daily  heparin  Injectable 5000 Unit(s) SubCutaneous every 8 hours  NIFEdipine XL 30 milliGRAM(s) Oral once  sodium chloride 0.9%. 1000 milliLiter(s) (100 mL/Hr) IV Continuous <Continuous>    MEDICATIONS  (PRN):  acetaminophen   Tablet .. 650 milliGRAM(s) Oral every 6 hours PRN Mild Pain (1 - 3)  HYDROmorphone  Injectable 4 milliGRAM(s) IV Push every 4 hours PRN Severe Pain (7 - 10)      LABS:                          6.5    12.28 )-----------( 273      ( 20 Feb 2019 03:11 )             18.6         Mean Cell Volume : 86.9 fL  Mean Cell Hemoglobin : 30.4 pg  Mean Cell Hemoglobin Concentration : 34.9 g/dL  Auto Neutrophil # : x  Auto Lymphocyte # : x  Auto Monocyte # : x  Auto Eosinophil # : x  Auto Basophil # : x  Auto Neutrophil % : x  Auto Lymphocyte % : x  Auto Monocyte % : x  Auto Eosinophil % : x  Auto Basophil % : x      Serial CBC's  02-20 @ 03:11  Hct-18.6 / Hgb-6.5 / Plat-273 / RBC-2.14 / WBC-12.28  Serial CBC's  02-19 @ 05:51  Hct-19.3 / Hgb-6.9 / Plat-311 / RBC-2.25 / WBC-10.81  Serial CBC's  02-18 @ 20:05  Hct-19.6 / Hgb-7.2 / Plat-307 / RBC-2.30 / WBC-11.04  Serial CBC's  02-17 @ 19:12  Hct-20.4 / Hgb-7.5 / Plat-317 / RBC-2.45 / WBC-11.24  Serial CBC's  02-16 @ 22:24  Hct--- / Hgb--- / Plat--- / RBC-1.67 / WBC---  Serial CBC's  02-16 @ 21:38  Hct-15.0 / Hgb-5.5 / Plat-297 / RBC-1.79 / WBC-12.61      02-19    139  |  109  |  42<H>  ----------------------------<  98  4.6   |  17  |  2.7<H>    Ca    8.8      19 Feb 2019 05:51  Phos  4.8     02-19  Mg     1.6     02-19    TPro  6.6  /  Alb  3.2<L>  /  TBili  1.7<H>  /  DBili  x   /  AST  85<H>  /  ALT  41  /  AlkPhos  143<H>  02-19      Reticulocyte Percent: 20.9 % (02-19 @ 05:51)  Reticulocyte Percent: 19.7 % (02-18 @ 20:05)  Reticulocyte Percent: 4.5 % (02-16 @ 22:24)    Serum Protein Electrophoresis Interp: Normal Electrophoresis Pattern (02-18 @ 20:05)  Immunofixation, Serum:   No Monoclonal Band Identified    Reference Range: None Detected (02-18 @ 20:05)    Culture - Stool (collected 17 Feb 2019 19:52)  Source: .Stool Feces  Final Report (19 Feb 2019 17:54):    No enteric pathogens isolated.    (Stool culture examined for Salmonella,    Shigella, Campylobacter, Aeromonas, Plesiomonas,    Vibrio, E.coli O157 and Yersinia)    No enteric gram negative rods isolated

## 2019-02-21 LAB
BASOPHILS # BLD AUTO: 0.05 K/UL — SIGNIFICANT CHANGE UP (ref 0–0.2)
BASOPHILS NFR BLD AUTO: 0.4 % — SIGNIFICANT CHANGE UP (ref 0–1)
EOSINOPHIL # BLD AUTO: 0.59 K/UL — SIGNIFICANT CHANGE UP (ref 0–0.7)
EOSINOPHIL NFR BLD AUTO: 4.9 % — SIGNIFICANT CHANGE UP (ref 0–8)
HCT VFR BLD CALC: 19 % — LOW (ref 42–52)
HEMOGLOBIN INTERPRETATION: SIGNIFICANT CHANGE UP
HGB A MFR BLD: 20.5 % — LOW (ref 95.8–98)
HGB A2 MFR BLD: 3.2 % — SIGNIFICANT CHANGE UP (ref 2–3.2)
HGB BLD-MCNC: 6.8 G/DL — CRITICAL LOW (ref 14–18)
HGB F MFR BLD: 2.9 % — HIGH (ref 0–1)
HGB S MFR BLD: 73.4 % — HIGH
IMM GRANULOCYTES NFR BLD AUTO: 1.1 % — HIGH (ref 0.1–0.3)
LYMPHOCYTES # BLD AUTO: 1.65 K/UL — SIGNIFICANT CHANGE UP (ref 1.2–3.4)
LYMPHOCYTES # BLD AUTO: 13.7 % — LOW (ref 20.5–51.1)
MCHC RBC-ENTMCNC: 30.9 PG — SIGNIFICANT CHANGE UP (ref 27–31)
MCHC RBC-ENTMCNC: 35.8 G/DL — SIGNIFICANT CHANGE UP (ref 32–37)
MCV RBC AUTO: 86.4 FL — SIGNIFICANT CHANGE UP (ref 80–94)
MONOCYTES # BLD AUTO: 1.68 K/UL — HIGH (ref 0.1–0.6)
MONOCYTES NFR BLD AUTO: 14 % — HIGH (ref 1.7–9.3)
NEUTROPHILS # BLD AUTO: 7.91 K/UL — HIGH (ref 1.4–6.5)
NEUTROPHILS NFR BLD AUTO: 65.9 % — SIGNIFICANT CHANGE UP (ref 42.2–75.2)
NRBC # BLD: 24 /100 WBCS — HIGH (ref 0–0)
PLATELET # BLD AUTO: 299 K/UL — SIGNIFICANT CHANGE UP (ref 130–400)
RBC # BLD: 2.2 M/UL — LOW (ref 4.7–6.1)
RBC # FLD: 22.3 % — HIGH (ref 11.5–14.5)
WBC # BLD: 12.01 K/UL — HIGH (ref 4.8–10.8)
WBC # FLD AUTO: 12.01 K/UL — HIGH (ref 4.8–10.8)

## 2019-02-21 RX ORDER — KETOROLAC TROMETHAMINE 30 MG/ML
15 SYRINGE (ML) INJECTION ONCE
Qty: 0 | Refills: 0 | Status: DISCONTINUED | OUTPATIENT
Start: 2019-02-21 | End: 2019-02-21

## 2019-02-21 RX ORDER — HYDROMORPHONE HYDROCHLORIDE 2 MG/ML
1 INJECTION INTRAMUSCULAR; INTRAVENOUS; SUBCUTANEOUS ONCE
Qty: 0 | Refills: 0 | Status: DISCONTINUED | OUTPATIENT
Start: 2019-02-21 | End: 2019-02-21

## 2019-02-21 RX ADMIN — Medication 60 MILLIGRAM(S): at 06:00

## 2019-02-21 RX ADMIN — CHLORHEXIDINE GLUCONATE 1 APPLICATION(S): 213 SOLUTION TOPICAL at 06:00

## 2019-02-21 RX ADMIN — HYDROMORPHONE HYDROCHLORIDE 10 MILLIGRAM(S): 2 INJECTION INTRAMUSCULAR; INTRAVENOUS; SUBCUTANEOUS at 23:15

## 2019-02-21 RX ADMIN — Medication 50 MILLIGRAM(S): at 06:00

## 2019-02-21 RX ADMIN — HYDROMORPHONE HYDROCHLORIDE 1 MILLIGRAM(S): 2 INJECTION INTRAMUSCULAR; INTRAVENOUS; SUBCUTANEOUS at 03:33

## 2019-02-21 RX ADMIN — HEPARIN SODIUM 5000 UNIT(S): 5000 INJECTION INTRAVENOUS; SUBCUTANEOUS at 21:39

## 2019-02-21 RX ADMIN — CEFTRIAXONE 100 GRAM(S): 500 INJECTION, POWDER, FOR SOLUTION INTRAMUSCULAR; INTRAVENOUS at 15:04

## 2019-02-21 RX ADMIN — HEPARIN SODIUM 5000 UNIT(S): 5000 INJECTION INTRAVENOUS; SUBCUTANEOUS at 06:00

## 2019-02-21 RX ADMIN — Medication 50 MILLIGRAM(S): at 23:15

## 2019-02-21 RX ADMIN — HYDROMORPHONE HYDROCHLORIDE 1 MILLIGRAM(S): 2 INJECTION INTRAMUSCULAR; INTRAVENOUS; SUBCUTANEOUS at 18:09

## 2019-02-21 RX ADMIN — AZITHROMYCIN 255 MILLIGRAM(S): 500 TABLET, FILM COATED ORAL at 15:05

## 2019-02-21 RX ADMIN — HYDROMORPHONE HYDROCHLORIDE 10 MILLIGRAM(S): 2 INJECTION INTRAMUSCULAR; INTRAVENOUS; SUBCUTANEOUS at 23:45

## 2019-02-21 RX ADMIN — HYDROMORPHONE HYDROCHLORIDE 1 MILLIGRAM(S): 2 INJECTION INTRAMUSCULAR; INTRAVENOUS; SUBCUTANEOUS at 00:53

## 2019-02-21 RX ADMIN — Medication 1 MILLIGRAM(S): at 11:27

## 2019-02-21 RX ADMIN — HYDROMORPHONE HYDROCHLORIDE 1 MILLIGRAM(S): 2 INJECTION INTRAMUSCULAR; INTRAVENOUS; SUBCUTANEOUS at 11:27

## 2019-02-21 RX ADMIN — Medication 50 MILLIGRAM(S): at 00:54

## 2019-02-21 RX ADMIN — Medication 50 MILLIGRAM(S): at 11:26

## 2019-02-21 RX ADMIN — HYDROMORPHONE HYDROCHLORIDE 1 MILLIGRAM(S): 2 INJECTION INTRAMUSCULAR; INTRAVENOUS; SUBCUTANEOUS at 15:05

## 2019-02-21 RX ADMIN — Medication 50 MILLIGRAM(S): at 18:10

## 2019-02-21 NOTE — PROGRESS NOTE ADULT - ASSESSMENT
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

## 2019-02-21 NOTE — PROGRESS NOTE ADULT - SUBJECTIVE AND OBJECTIVE BOX
Patient is a 38y old  Male who presents with a chief complaint of Sickle cell disease vasoocclusive crisis (21 Feb 2019 07:10)      Subjective:      Vital Signs Last 24 Hrs  T(C): 35.2 (21 Feb 2019 05:00), Max: 36.6 (20 Feb 2019 13:55)  T(F): 95.3 (21 Feb 2019 05:00), Max: 97.9 (20 Feb 2019 13:55)  HR: 99 (21 Feb 2019 05:00) (95 - 99)  BP: 117/99 (21 Feb 2019 05:00) (117/99 - 171/100)  BP(mean): --  RR: 19 (21 Feb 2019 05:00) (19 - 20)  SpO2: --    PHYSICAL EXAM  General: adult in NAD  HEENT: clear oropharynx, anicteric sclera, pink conjunctiva  Neck: supple  CV: normal S1/S2 with no murmur rubs or gallops  Lungs: positive air movement b/l ant lungs,clear to auscultation, no wheezes, no rales  Abdomen: soft non-tender non-distended, no hepatosplenomegaly  Ext: no clubbing cyanosis or edema  Skin: no rashes and no petechiae  Neuro: alert and oriented X 4, no focal deficits    MEDICATIONS  (STANDING):  azithromycin  IVPB      azithromycin  IVPB 500 milliGRAM(s) IV Intermittent every 24 hours  cefTRIAXone   IVPB 1 Gram(s) IV Intermittent every 24 hours  chlorhexidine 4% Liquid 1 Application(s) Topical <User Schedule>  diphenhydrAMINE   Injectable 50 milliGRAM(s) IV Push every 6 hours  folic acid 1 milliGRAM(s) Oral daily  heparin  Injectable 5000 Unit(s) SubCutaneous every 8 hours  NIFEdipine XL 60 milliGRAM(s) Oral daily  sodium chloride 0.9%. 1000 milliLiter(s) (100 mL/Hr) IV Continuous <Continuous>    MEDICATIONS  (PRN):  acetaminophen   Tablet .. 650 milliGRAM(s) Oral every 6 hours PRN Mild Pain (1 - 3)  HYDROmorphone   Tablet 10 milliGRAM(s) Oral every 4 hours PRN Severe Pain (7 - 10)  HYDROmorphone  Injectable 1 milliGRAM(s) IV Push every 6 hours PRN Severe Pain (7 - 10)  ondansetron    Tablet 4 milliGRAM(s) Oral every 6 hours PRN Nausea and/or Vomiting      LABS:                          6.8    12.01 )-----------( 299      ( 20 Feb 2019 23:56 )             19.0         Mean Cell Volume : 86.4 fL  Mean Cell Hemoglobin : 30.9 pg  Mean Cell Hemoglobin Concentration : 35.8 g/dL  Auto Neutrophil # : 7.91 K/uL  Auto Lymphocyte # : 1.65 K/uL  Auto Monocyte # : 1.68 K/uL  Auto Eosinophil # : 0.59 K/uL  Auto Basophil # : 0.05 K/uL  Auto Neutrophil % : 65.9 %  Auto Lymphocyte % : 13.7 %  Auto Monocyte % : 14.0 %  Auto Eosinophil % : 4.9 %  Auto Basophil % : 0.4 %      Serial CBC's  02-20 @ 23:56  Hct-19.0 / Hgb-6.8 / Plat-299 / RBC-2.20 / WBC-12.01  Serial CBC's  02-20 @ 06:56  Hct-19.3 / Hgb-6.9 / Plat-258 / RBC-2.23 / WBC-11.55  Serial CBC's  02-20 @ 03:11  Hct-18.6 / Hgb-6.5 / Plat-273 / RBC-2.14 / WBC-12.28  Serial CBC's  02-19 @ 05:51  Hct-19.3 / Hgb-6.9 / Plat-311 / RBC-2.25 / WBC-10.81  Serial CBC's  02-18 @ 20:05  Hct-19.6 / Hgb-7.2 / Plat-307 / RBC-2.30 / WBC-11.04  Serial CBC's  02-17 @ 19:12  Hct-20.4 / Hgb-7.5 / Plat-317 / RBC-2.45 / WBC-11.24      02-20    137  |  103  |  38<H>  ----------------------------<  82  5.2<H>   |  17  |  2.6<H>    Ca    8.6      20 Feb 2019 06:56  Phos  4.8     02-20  Mg     1.6     02-20    TPro  6.8  /  Alb  3.2<L>  /  TBili  1.6<H>  /  DBili  x   /  AST  87<H>  /  ALT  36  /  AlkPhos  140<H>  02-20          Reticulocyte Percent: 22.9 % (02-20 @ 06:56)  Reticulocyte Percent: 20.9 % (02-19 @ 05:51)  Reticulocyte Percent: 19.7 % (02-18 @ 20:05)  Reticulocyte Percent: 4.5 % (02-16 @ 22:24)      Serum Protein Electrophoresis Interp: Normal Electrophoresis Pattern (02-18 @ 20:05)  Immunofixation, Serum:   No Monoclonal Band Identified    Reference Range: None Detected (02-18 @ 20:05)    Culture - Blood (collected 19 Feb 2019 05:51)  Source: .Blood None  Preliminary Report (20 Feb 2019 14:00):    No growth to date. Patient is a 38y old  Male who presents with a chief complaint of Sickle cell disease vasoocclusive crisis (21 Feb 2019 07:10)      Subjective: Patient seen and examined.  He states that he is feeling better and that his pain is better controlled.  He is not short of breath.      Vital Signs Last 24 Hrs  T(C): 35.2 (21 Feb 2019 05:00), Max: 36.6 (20 Feb 2019 13:55)  T(F): 95.3 (21 Feb 2019 05:00), Max: 97.9 (20 Feb 2019 13:55)  HR: 99 (21 Feb 2019 05:00) (95 - 99)  BP: 117/99 (21 Feb 2019 05:00) (117/99 - 171/100)  BP(mean): --  RR: 19 (21 Feb 2019 05:00) (19 - 20)  SpO2: --    PHYSICAL EXAM  General: adult in NAD, sleeping comfortably in bed, awakens easily  HEENT: +R EJ in place  Neck: supple  CV: +S1, +S2  Lungs: positive air movement b/l ant lungs, no wheezing  Ext: no edema  Skin: no rashes and no petechiae  Neuro: alert and oriented X 4, no focal deficits    MEDICATIONS  (STANDING):  azithromycin  IVPB      azithromycin  IVPB 500 milliGRAM(s) IV Intermittent every 24 hours  cefTRIAXone   IVPB 1 Gram(s) IV Intermittent every 24 hours  chlorhexidine 4% Liquid 1 Application(s) Topical <User Schedule>  diphenhydrAMINE   Injectable 50 milliGRAM(s) IV Push every 6 hours  folic acid 1 milliGRAM(s) Oral daily  heparin  Injectable 5000 Unit(s) SubCutaneous every 8 hours  NIFEdipine XL 60 milliGRAM(s) Oral daily  sodium chloride 0.9%. 1000 milliLiter(s) (100 mL/Hr) IV Continuous <Continuous>    MEDICATIONS  (PRN):  acetaminophen   Tablet .. 650 milliGRAM(s) Oral every 6 hours PRN Mild Pain (1 - 3)  HYDROmorphone   Tablet 10 milliGRAM(s) Oral every 4 hours PRN Severe Pain (7 - 10)  HYDROmorphone  Injectable 1 milliGRAM(s) IV Push every 6 hours PRN Severe Pain (7 - 10)  ondansetron    Tablet 4 milliGRAM(s) Oral every 6 hours PRN Nausea and/or Vomiting      LABS:                          6.8    12.01 )-----------( 299      ( 20 Feb 2019 23:56 )             19.0         Mean Cell Volume : 86.4 fL  Mean Cell Hemoglobin : 30.9 pg  Mean Cell Hemoglobin Concentration : 35.8 g/dL  Auto Neutrophil # : 7.91 K/uL  Auto Lymphocyte # : 1.65 K/uL  Auto Monocyte # : 1.68 K/uL  Auto Eosinophil # : 0.59 K/uL  Auto Basophil # : 0.05 K/uL  Auto Neutrophil % : 65.9 %  Auto Lymphocyte % : 13.7 %  Auto Monocyte % : 14.0 %  Auto Eosinophil % : 4.9 %  Auto Basophil % : 0.4 %      Serial CBC's  02-20 @ 23:56  Hct-19.0 / Hgb-6.8 / Plat-299 / RBC-2.20 / WBC-12.01  Serial CBC's  02-20 @ 06:56  Hct-19.3 / Hgb-6.9 / Plat-258 / RBC-2.23 / WBC-11.55  Serial CBC's  02-20 @ 03:11  Hct-18.6 / Hgb-6.5 / Plat-273 / RBC-2.14 / WBC-12.28  Serial CBC's  02-19 @ 05:51  Hct-19.3 / Hgb-6.9 / Plat-311 / RBC-2.25 / WBC-10.81  Serial CBC's  02-18 @ 20:05  Hct-19.6 / Hgb-7.2 / Plat-307 / RBC-2.30 / WBC-11.04  Serial CBC's  02-17 @ 19:12  Hct-20.4 / Hgb-7.5 / Plat-317 / RBC-2.45 / WBC-11.24      02-20    137  |  103  |  38<H>  ----------------------------<  82  5.2<H>   |  17  |  2.6<H>    Ca    8.6      20 Feb 2019 06:56  Phos  4.8     02-20  Mg     1.6     02-20    TPro  6.8  /  Alb  3.2<L>  /  TBili  1.6<H>  /  DBili  x   /  AST  87<H>  /  ALT  36  /  AlkPhos  140<H>  02-20          Reticulocyte Percent: 22.9 % (02-20 @ 06:56)  Reticulocyte Percent: 20.9 % (02-19 @ 05:51)  Reticulocyte Percent: 19.7 % (02-18 @ 20:05)  Reticulocyte Percent: 4.5 % (02-16 @ 22:24)      Serum Protein Electrophoresis Interp: Normal Electrophoresis Pattern (02-18 @ 20:05)  Immunofixation, Serum:   No Monoclonal Band Identified    Reference Range: None Detected (02-18 @ 20:05)    Culture - Blood (collected 19 Feb 2019 05:51)  Source: .Blood None  Preliminary Report (20 Feb 2019 14:00):    No growth to date.

## 2019-02-21 NOTE — PROGRESS NOTE ADULT - SUBJECTIVE AND OBJECTIVE BOX
seen and examined  no distress   lying comfortable      Standing Inpatient Medications  azithromycin  IVPB      azithromycin  IVPB 500 milliGRAM(s) IV Intermittent every 24 hours  cefTRIAXone   IVPB 1 Gram(s) IV Intermittent every 24 hours  chlorhexidine 4% Liquid 1 Application(s) Topical <User Schedule>  diphenhydrAMINE   Injectable 50 milliGRAM(s) IV Push every 6 hours  folic acid 1 milliGRAM(s) Oral daily  heparin  Injectable 5000 Unit(s) SubCutaneous every 8 hours  NIFEdipine XL 60 milliGRAM(s) Oral daily  sodium chloride 0.9%. 1000 milliLiter(s) IV Continuous <Continuous>          VITALS/PHYSICAL EXAM  --------------------------------------------------------------------------------  T(C): 35.2 (02-21-19 @ 05:00), Max: 36.6 (02-20-19 @ 13:55)  HR: 99 (02-21-19 @ 05:00) (95 - 99)  BP: 117/99 (02-21-19 @ 05:00) (117/99 - 171/100)  RR: 19 (02-21-19 @ 05:00) (19 - 20)  SpO2: --  Wt(kg): --        02-20-19 @ 07:01  -  02-21-19 @ 07:00  --------------------------------------------------------  IN: 0 mL / OUT: 2100 mL / NET: -2100 mL      Physical Exam:  	Gen: NAD  	Pulm: decrease BS  B/L  	CV: S1S2; no rub  	Abd: +distended  	LE: no edema      LABS/STUDIES  --------------------------------------------------------------------------------              6.8    12.01 >-----------<  299      [02-20-19 @ 23:56]              19.0     137  |  103  |  38  ----------------------------<  82      [02-20-19 @ 06:56]  5.2   |  17  |  2.6        Ca     8.6     [02-20-19 @ 06:56]      Mg     1.6     [02-20-19 @ 06:56]      Phos  4.8     [02-20-19 @ 06:56]    TPro  6.8  /  Alb  3.2  /  TBili  1.6  /  DBili  x   /  AST  87  /  ALT  36  /  AlkPhos  140  [02-20-19 @ 06:56]        LDH 1152      [02-20-19 @ 06:56]    Creatinine Trend:  SCr 2.6 [02-20 @ 06:56]  SCr 2.7 [02-19 @ 05:51]  SCr 2.8 [02-18 @ 20:05]  SCr 3.0 [02-17 @ 19:12]  SCr 3.4 [02-16 @ 21:38]    Urinalysis - [02-16-19 @ 22:24]      Color Yellow / Appearance Clear / SG 1.015 / pH 6.5      Gluc Negative / Ketone Negative  / Bili Small / Urobili 1.0       Blood Large / Protein >=300 / Leuk Est Negative / Nitrite Negative      RBC 3-5 / WBC  / Hyaline  / Gran  / Sq Epi  / Non Sq Epi Few / Bacteria Few    Urine Creatinine 81      [02-17-19 @ 10:30]  Urine Protein 510      [02-17-19 @ 10:30]  Urine Sodium 52.0      [02-17-19 @ 10:30]  Urine Potassium 31      [02-17-19 @ 10:30]  Urine Chloride 44      [02-17-19 @ 10:30]    PTH -- (Ca 8.6)      [02-18-19 @ 20:05]   177    HIV Nonreact      [02-18-19 @ 20:05]    dsDNA <12      [02-18-19 @ 20:05]  C3 Complement 89      [02-18-19 @ 20:05]  C4 Complement 27      [02-18-19 @ 20:05]  Immunofixation Serum:   No Monoclonal Band Identified    Reference Range: None Detected      [02-18-19 @ 20:05]  SPEP Interpretation: Normal Electrophoresis Pattern      [02-18-19 @ 20:05]

## 2019-02-21 NOTE — PROGRESS NOTE ADULT - ASSESSMENT
38 yom with PMH of sickle cell disease, on hydroxyurea only (does not recall who his hematologist was), says baseline hgb of 7, presents with c/o nausea and "parasites" on skin and stool.  In the ED, the patient had a Hgb of 5.5, reticulocyte 4.5%, creatinine of 3.4. (No previous creatinine available for comparison.)  Patient responded appropriately to 2 units of PRBCs, with hemoglobin of 7.5.  Hematology consulted for concern of acute chest syndrome.    1. Sickle cell disease with concern for acute chest syndrome-Recommend daily CXRs and careful monitoring of oxygen saturation/respiratory status.  Patient continues to clinically improve.  Patient states that he has never had an exchange transfusion before and does not feel he needs one now.  S/p 3 units of PRBCs.  Hemoglobin electrophoresis is pending.  Continue pain control and folic acid supplementation.  May add hydroxyurea at later date, depending on renal function.    Patient will benefit from close outpatient hematology follow up.

## 2019-02-21 NOTE — PROGRESS NOTE ADULT - REASON FOR ADMISSION
sickle cell vasoocclusive crisis
SCD crises
Sickle cell disease vasoocclusive crisis
sickle cell pain crisis

## 2019-02-21 NOTE — PROGRESS NOTE ADULT - ASSESSMENT
KARLA MONROY 38y Male  MRN#: [id number]   CODE STATUS:________      SUBJECTIVE  Patient is a 38y old Male who presents with a chief complaint of sickle cell vasoocclusive crisis (20 Feb 2019 08:15)  Currently admitted to medicine with the primary diagnosis of Sickle cell anemia  Hospital course has been complicated by _______.   Today is hospital day 4d, and this morning he is _________ and reports ________ overnight events.     Present Today:           [place]anton Catheter ()No/ ()Yes? Indication:          Central Line ()No/ ()Yes? Indication:          IV Fluids ()No/ ()Yes? Type:  Rate:  Indication:      OBJECTIVE  PAST MEDICAL & SURGICAL HISTORY  Sickle cell anemia    ALLERGIES:  No Known Allergies    MEDICATIONS:  STANDING MEDICATIONS  azithromycin  IVPB      azithromycin  IVPB 500 milliGRAM(s) IV Intermittent every 24 hours  cefTRIAXone   IVPB 1 Gram(s) IV Intermittent every 24 hours  chlorhexidine 4% Liquid 1 Application(s) Topical <User Schedule>  diphenhydrAMINE   Injectable 50 milliGRAM(s) IV Push every 6 hours  folic acid 1 milliGRAM(s) Oral daily  heparin  Injectable 5000 Unit(s) SubCutaneous every 8 hours  NIFEdipine XL 60 milliGRAM(s) Oral daily  sodium chloride 0.9%. 1000 milliLiter(s) IV Continuous <Continuous>    PRN MEDICATIONS  acetaminophen   Tablet .. 650 milliGRAM(s) Oral every 6 hours PRN  HYDROmorphone   Tablet 10 milliGRAM(s) Oral every 4 hours PRN  HYDROmorphone  Injectable 1 milliGRAM(s) IV Push every 6 hours PRN  ondansetron    Tablet 4 milliGRAM(s) Oral every 6 hours PRN      VITAL SIGNS: Last 24 Hours  T(C): 35.2 (21 Feb 2019 05:00), Max: 36.6 (20 Feb 2019 13:55)  T(F): 95.3 (21 Feb 2019 05:00), Max: 97.9 (20 Feb 2019 13:55)  HR: 99 (21 Feb 2019 05:00) (95 - 99)  BP: 117/99 (21 Feb 2019 05:00) (117/99 - 171/100)  BP(mean): --  RR: 19 (21 Feb 2019 05:00) (19 - 20)  SpO2: --    LABS:                        6.8    12.01 )-----------( 299      ( 20 Feb 2019 23:56 )             19.0     02-20    137  |  103  |  38<H>  ----------------------------<  82  5.2<H>   |  17  |  2.6<H>    Ca    8.6      20 Feb 2019 06:56  Phos  4.8     02-20  Mg     1.6     02-20    TPro  6.8  /  Alb  3.2<L>  /  TBili  1.6<H>  /  DBili  x   /  AST  87<H>  /  ALT  36  /  AlkPhos  140<H>  02-20              Culture - Blood (collected 19 Feb 2019 05:51)  Source: .Blood None  Preliminary Report (20 Feb 2019 14:00):    No growth to date.          RADIOLOGY:      PHYSICAL EXAM:    GENERAL: NAD, well-developed, AAOx3  HEENT:  Atraumatic, Normocephalic. EOMI, PERRLA, conjunctiva and sclera clear, No JVD  PULMONARY: Clear to auscultation bilaterally; No wheeze  CARDIOVASCULAR: Regular rate and rhythm; No murmurs, rubs, or gallops  GASTROINTESTINAL: Soft, Nontender, Nondistended; Bowel sounds present  MUSCULOSKELETAL:  2+ Peripheral Pulses, No clubbing, cyanosis, or edema  NEUROLOGY: non-focal  SKIN: No rashes or lesions      ADMISSION SUMMARY  Patient is a 38y old Male who presents with a chief complaint of sickle cell vasoocclusive crisis (20 Feb 2019 08:15)  Currently admitted to medicine with the primary diagnosis of Sickle cell anemia  Hospital course has been complicated by _______.       ASSESSMENT & PLAN    1. SICKLE CELL ANEMIA;ACUTE KIDNEY INJURY      2.    3. Sickle cell anemia        Present today:  ( ) Congestive Heart Failure, Yes? ( )Acute / ( )Acute on Chronic / ( )Chronic  :  ( )Systolic / ( )Diastolic               Plan:  ( ) Complicated Pneumonia, Type?  ( )Parapneumonic effusion / ( )Abscess / ( ) Multilobar / ( )Other               Plan:  ( ) Morbid Obesity, Yes? BMI:               Plan:  ( ) Functional Quadriplegia               Plan:  ( ) Encephalopathy               Plan:    ( ) Discussion with patient and/or family regarding goals of care  ( ) Discussed Case and Plan with Medical Attending, Name:      # Planned Disposition: ________ KARLA MONROY 38y Male  MRN#: [id number]   CODE STATUS:FULLCODE      SUBJECTIVE  Patient is a 38y old Male who presents with a chief complaint of sickle cell vasoocclusive crisis (20 Feb 2019 08:15)  Currently admitted to medicine with the primary diagnosis of Sickle cell crises   Today is hospital day 4d, and this morning he is resting comfortably in bed and reports no overnight events.   Denies Chest pain or SOB, pain is controlled on current regimen. No Acute events in the last 24 hours     OBJECTIVE  PAST MEDICAL & SURGICAL HISTORY  Sickle cell anemia    ALLERGIES:  No Known Allergies    MEDICATIONS:  STANDING MEDICATIONS  azithromycin  IVPB      azithromycin  IVPB 500 milliGRAM(s) IV Intermittent every 24 hours  cefTRIAXone   IVPB 1 Gram(s) IV Intermittent every 24 hours  chlorhexidine 4% Liquid 1 Application(s) Topical <User Schedule>  diphenhydrAMINE   Injectable 50 milliGRAM(s) IV Push every 6 hours  folic acid 1 milliGRAM(s) Oral daily  heparin  Injectable 5000 Unit(s) SubCutaneous every 8 hours  NIFEdipine XL 60 milliGRAM(s) Oral daily  sodium chloride 0.9%. 1000 milliLiter(s) IV Continuous <Continuous>    PRN MEDICATIONS  acetaminophen   Tablet .. 650 milliGRAM(s) Oral every 6 hours PRN  HYDROmorphone   Tablet 10 milliGRAM(s) Oral every 4 hours PRN  HYDROmorphone  Injectable 1 milliGRAM(s) IV Push every 6 hours PRN  ondansetron    Tablet 4 milliGRAM(s) Oral every 6 hours PRN      VITAL SIGNS: Last 24 Hours  T(C): 35.2 (21 Feb 2019 05:00), Max: 36.6 (20 Feb 2019 13:55)  T(F): 95.3 (21 Feb 2019 05:00), Max: 97.9 (20 Feb 2019 13:55)  HR: 99 (21 Feb 2019 05:00) (95 - 99)  BP: 117/99 (21 Feb 2019 05:00) (117/99 - 171/100)  BP(mean): --  RR: 19 (21 Feb 2019 05:00) (19 - 20)  SpO2: --    LABS:                        6.8    12.01 )-----------( 299      ( 20 Feb 2019 23:56 )             19.0     02-20    137  |  103  |  38<H>  ----------------------------<  82  5.2<H>   |  17  |  2.6<H>    Ca    8.6      20 Feb 2019 06:56  Phos  4.8     02-20  Mg     1.6     02-20    TPro  6.8  /  Alb  3.2<L>  /  TBili  1.6<H>  /  DBili  x   /  AST  87<H>  /  ALT  36  /  AlkPhos  140<H>  02-20              Culture - Blood (collected 19 Feb 2019 05:51)  Source: .Blood None  Preliminary Report (20 Feb 2019 14:00):    No growth to date.    RADIOLOGY:      PHYSICAL EXAM:    GENERAL: NAD, well-developed, AAOx3  HEENT:  Atraumatic, Normocephalic.   PULMONARY: Clear to auscultation bilaterally; No wheeze  CARDIOVASCULAR: Regular rate and rhythm;   GASTROINTESTINAL: Soft, Nontender, Nondistended;   MUSCULOSKELETAL: No clubbing, cyanosis, or edema  NEUROLOGY: non-focal  SKIN: No rashes or lesions    ASSESSMENT & PLAN    38 M w/ sickle cell disease c/o parasites crawling on him, found to have hgb 5.5, being admitted for sickle cell vasoocclusive pain crisis .     # Vasoocclusive sickle cell disease pain with Sickle cell crisis.   No acute chest syndrome . Ruled out  - hbg is stable 6.8 today   - s/p 2u prbc on 2/17,   - O2 sat 100 on room air, improved  - stable bilateral opacities on CXR,   - v/q scan is negative,   - CE is negative and no acute ischemic changes in EKG  - will d/c IVF today. encourage PO intake   - on ceftriaxone and azithromycin   - s/p Hydromorphone 4mg IV q4, switched to hydromorphone 10 PO.  - c/w folic acid  - Incentive spirometry, and DVT ppx   - Hematology on board     # Likely KAMRAN on CKD 3 vs CKD   - Cr improving 3 > 2.7 > 2.6 > refused lab today, will repeat afternoon   - proteuria, no urine RBC with positive dipstick due to hemolysis, CK WNL  - d/c IVF  - nephro on board     # HTN, controlled   - will increase Nifedipine ER to 60 QD. add  30 mg one dose today.      # Sensation of parasites  - resolved   - will consider Psych consult     # Folic acid deficiency - Gets supplements , Sickle cell patient with expected cell breakdown     # DVTppx: Lovenox  # dispo: will anticipate for D/c tomorrow KARLA MONROY 38y Male  MRN#: [id number]   CODE STATUS:FULLCODE      SUBJECTIVE  Patient is a 38y old Male who presents with a chief complaint of sickle cell vasoocclusive crisis (20 Feb 2019 08:15)  Currently admitted to medicine with the primary diagnosis of Sickle cell crises   Today is hospital day 4d, and this morning he is resting comfortably in bed and reports no overnight events.   Denies Chest pain or SOB, pain is controlled on current regimen. No Acute events in the last 24 hours     OBJECTIVE  PAST MEDICAL & SURGICAL HISTORY  Sickle cell anemia    ALLERGIES:  No Known Allergies    MEDICATIONS:  STANDING MEDICATIONS  azithromycin  IVPB      azithromycin  IVPB 500 milliGRAM(s) IV Intermittent every 24 hours  cefTRIAXone   IVPB 1 Gram(s) IV Intermittent every 24 hours  chlorhexidine 4% Liquid 1 Application(s) Topical <User Schedule>  diphenhydrAMINE   Injectable 50 milliGRAM(s) IV Push every 6 hours  folic acid 1 milliGRAM(s) Oral daily  heparin  Injectable 5000 Unit(s) SubCutaneous every 8 hours  NIFEdipine XL 60 milliGRAM(s) Oral daily  sodium chloride 0.9%. 1000 milliLiter(s) IV Continuous <Continuous>    PRN MEDICATIONS  acetaminophen   Tablet .. 650 milliGRAM(s) Oral every 6 hours PRN  HYDROmorphone   Tablet 10 milliGRAM(s) Oral every 4 hours PRN  HYDROmorphone  Injectable 1 milliGRAM(s) IV Push every 6 hours PRN  ondansetron    Tablet 4 milliGRAM(s) Oral every 6 hours PRN      VITAL SIGNS: Last 24 Hours  T(C): 35.2 (21 Feb 2019 05:00), Max: 36.6 (20 Feb 2019 13:55)  T(F): 95.3 (21 Feb 2019 05:00), Max: 97.9 (20 Feb 2019 13:55)  HR: 99 (21 Feb 2019 05:00) (95 - 99)  BP: 117/99 (21 Feb 2019 05:00) (117/99 - 171/100)  RR: 19 (21 Feb 2019 05:00) (19 - 20)    LABS:                        6.8    12.01 )-----------( 299      ( 20 Feb 2019 23:56 )             19.0     02-20    137  |  103  |  38<H>  ----------------------------<  82  5.2<H>   |  17  |  2.6<H>    Ca    8.6      20 Feb 2019 06:56  Phos  4.8     02-20  Mg     1.6     02-20    TPro  6.8  /  Alb  3.2<L>  /  TBili  1.6<H>  /  DBili  x   /  AST  87<H>  /  ALT  36  /  AlkPhos  140<H>  02-20      PHYSICAL EXAM:    GENERAL: NAD, well-developed, AAOx3  HEENT:  Atraumatic, Normocephalic.   PULMONARY: Clear to auscultation bilaterally; No wheeze  CARDIOVASCULAR: Regular rate and rhythm;   GASTROINTESTINAL: Soft, Nontender, Nondistended;   MUSCULOSKELETAL: No clubbing, cyanosis, or edema  NEUROLOGY: non-focal  SKIN: No rashes or lesions    ASSESSMENT & PLAN    38 M w/ sickle cell disease c/o parasites crawling on him, found to have hgb 5.5, being admitted for sickle cell vasoocclusive pain crisis .     # Vasoocclusive sickle cell disease pain with Sickle cell crisis.   No acute chest syndrome . Ruled out  - hbg is stable 6.8 today   - s/p 2u prbc on 2/17,   - O2 sat 100 on room air, improved  - stable bilateral opacities on CXR,   - v/q scan is negative,   - CE is negative and no acute ischemic changes in EKG  - will d/c IVF today. encourage PO intake   - on ceftriaxone and azithromycin   - s/p Hydromorphone 4mg IV q4, switched to hydromorphone 10 PO.  - c/w folic acid  - Incentive spirometry, and DVT ppx   - Hematology on board     # Likely KAMRAN on CKD 3 vs CKD   - Cr improving 3 > 2.7 > 2.6 > refused lab today, will repeat afternoon   - proteuria, no urine RBC with positive dipstick due to hemolysis, CK WNL  - d/c IVF  - nephro on board     # HTN, controlled   - will increase Nifedipine ER to 60 QD. add  30 mg one dose today.      # Sensation of parasites  - resolved   - will consider Psych consult     # Folic acid deficiency - Gets supplements , Sickle cell patient with expected cell breakdown     # DVTppx: Lovenox  # dispo: will anticipate for D/c tomorrow

## 2019-02-22 ENCOUNTER — INBOUND DOCUMENT (OUTPATIENT)
Age: 39
End: 2019-02-22

## 2019-02-22 ENCOUNTER — TRANSCRIPTION ENCOUNTER (OUTPATIENT)
Age: 39
End: 2019-02-22

## 2019-02-22 VITALS
RESPIRATION RATE: 18 BRPM | HEART RATE: 98 BPM | TEMPERATURE: 97 F | SYSTOLIC BLOOD PRESSURE: 162 MMHG | DIASTOLIC BLOOD PRESSURE: 108 MMHG

## 2019-02-22 PROBLEM — D57.1 SICKLE-CELL DISEASE WITHOUT CRISIS: Chronic | Status: ACTIVE | Noted: 2019-02-16

## 2019-02-22 PROBLEM — Z00.00 ENCOUNTER FOR PREVENTIVE HEALTH EXAMINATION: Status: ACTIVE | Noted: 2019-02-22

## 2019-02-22 LAB
ANION GAP SERPL CALC-SCNC: 20 MMOL/L — HIGH (ref 7–14)
BASOPHILS # BLD AUTO: 0.08 K/UL — SIGNIFICANT CHANGE UP (ref 0–0.2)
BASOPHILS NFR BLD AUTO: 0.6 % — SIGNIFICANT CHANGE UP (ref 0–1)
BUN SERPL-MCNC: 29 MG/DL — HIGH (ref 10–20)
CALCIUM SERPL-MCNC: 8.9 MG/DL — SIGNIFICANT CHANGE UP (ref 8.5–10.1)
CHLORIDE SERPL-SCNC: 103 MMOL/L — SIGNIFICANT CHANGE UP (ref 98–110)
CO2 SERPL-SCNC: 16 MMOL/L — LOW (ref 17–32)
CREAT SERPL-MCNC: 2.1 MG/DL — HIGH (ref 0.7–1.5)
EOSINOPHIL # BLD AUTO: 0.71 K/UL — HIGH (ref 0–0.7)
EOSINOPHIL NFR BLD AUTO: 5.1 % — SIGNIFICANT CHANGE UP (ref 0–8)
GLUCOSE SERPL-MCNC: 76 MG/DL — SIGNIFICANT CHANGE UP (ref 70–99)
HCT VFR BLD CALC: 20.3 % — LOW (ref 42–52)
HGB BLD-MCNC: 7.2 G/DL — CRITICAL LOW (ref 14–18)
IMM GRANULOCYTES NFR BLD AUTO: 1 % — HIGH (ref 0.1–0.3)
LYMPHOCYTES # BLD AUTO: 19.6 % — LOW (ref 20.5–51.1)
LYMPHOCYTES # BLD AUTO: 2.74 K/UL — SIGNIFICANT CHANGE UP (ref 1.2–3.4)
MCHC RBC-ENTMCNC: 30.8 PG — SIGNIFICANT CHANGE UP (ref 27–31)
MCHC RBC-ENTMCNC: 35.5 G/DL — SIGNIFICANT CHANGE UP (ref 32–37)
MCV RBC AUTO: 86.8 FL — SIGNIFICANT CHANGE UP (ref 80–94)
MONOCYTES # BLD AUTO: 1.77 K/UL — HIGH (ref 0.1–0.6)
MONOCYTES NFR BLD AUTO: 12.6 % — HIGH (ref 1.7–9.3)
NEUTROPHILS # BLD AUTO: 8.56 K/UL — HIGH (ref 1.4–6.5)
NEUTROPHILS NFR BLD AUTO: 61.1 % — SIGNIFICANT CHANGE UP (ref 42.2–75.2)
NRBC # BLD: 17 /100 WBCS — HIGH (ref 0–0)
PLATELET # BLD AUTO: 204 K/UL — SIGNIFICANT CHANGE UP (ref 130–400)
POTASSIUM SERPL-MCNC: 4.4 MMOL/L — SIGNIFICANT CHANGE UP (ref 3.5–5)
POTASSIUM SERPL-SCNC: 4.4 MMOL/L — SIGNIFICANT CHANGE UP (ref 3.5–5)
RBC # BLD: 2.34 M/UL — LOW (ref 4.7–6.1)
RBC # FLD: 23.9 % — HIGH (ref 11.5–14.5)
SODIUM SERPL-SCNC: 139 MMOL/L — SIGNIFICANT CHANGE UP (ref 135–146)
WBC # BLD: 14 K/UL — HIGH (ref 4.8–10.8)
WBC # FLD AUTO: 14 K/UL — HIGH (ref 4.8–10.8)

## 2019-02-22 RX ORDER — HYDROMORPHONE HYDROCHLORIDE 2 MG/ML
1 INJECTION INTRAMUSCULAR; INTRAVENOUS; SUBCUTANEOUS
Qty: 30 | Refills: 0 | OUTPATIENT
Start: 2019-02-22

## 2019-02-22 RX ORDER — DIPHENHYDRAMINE HCL 50 MG
1 CAPSULE ORAL
Qty: 30 | Refills: 0 | OUTPATIENT
Start: 2019-02-22

## 2019-02-22 RX ORDER — NIFEDIPINE 30 MG
1 TABLET, EXTENDED RELEASE 24 HR ORAL
Qty: 30 | Refills: 0 | OUTPATIENT
Start: 2019-02-22 | End: 2019-03-23

## 2019-02-22 RX ORDER — AZITHROMYCIN 500 MG/1
1 TABLET, FILM COATED ORAL
Qty: 2 | Refills: 0 | OUTPATIENT
Start: 2019-02-22 | End: 2019-02-23

## 2019-02-22 RX ORDER — FOLIC ACID 0.8 MG
1 TABLET ORAL
Qty: 30 | Refills: 0 | OUTPATIENT
Start: 2019-02-22 | End: 2019-03-23

## 2019-02-22 RX ADMIN — Medication 60 MILLIGRAM(S): at 05:52

## 2019-02-22 RX ADMIN — CHLORHEXIDINE GLUCONATE 1 APPLICATION(S): 213 SOLUTION TOPICAL at 05:53

## 2019-02-22 RX ADMIN — HEPARIN SODIUM 5000 UNIT(S): 5000 INJECTION INTRAVENOUS; SUBCUTANEOUS at 05:52

## 2019-02-22 RX ADMIN — Medication 1 MILLIGRAM(S): at 11:44

## 2019-02-22 NOTE — PROGRESS NOTE ADULT - ASSESSMENT
38 M w/ sickle cell disease c/o parasites crawling on him, found to have hgb 5.5, being admitted for sickle cell vasoocclusive pain crisis .     # Vasoocclusive sickle cell disease pain with Sickle cell crisis.   No acute chest syndrome . Ruled out  - hbg is stable  - s/p 2u prbc on 2/17  - O2 sat 100 on room air, improved  - stable bilateral opacities on CXR,   - v/q scan is negative,   - CE is negative and no acute ischemic changes in EKG    # Likely KAMRAN on CKD 3 vs CKD   - stable     # HTN, controlled   - will increase Nifedipine ER to 60 QD. add  30 mg one dose today.      # Sensation of parasites  - resolved   - will consider Psych consult     # Folic acid deficiency - Gets supplements , Sickle cell patient with expected cell breakdown     # DVTppx: Lovenox  # dispo: D/C today.   Diludid sent to pharmacy for 30 pills.

## 2019-02-22 NOTE — PROGRESS NOTE ADULT - SUBJECTIVE AND OBJECTIVE BOX
Nephrology progress note    Patient was seen and examined, events over the last 24 h noted .  Cr improving     Allergies:  No Known Allergies    Hospital Medications:   MEDICATIONS  (STANDING):  azithromycin  IVPB      azithromycin  IVPB 500 milliGRAM(s) IV Intermittent every 24 hours  cefTRIAXone   IVPB 1 Gram(s) IV Intermittent every 24 hours  chlorhexidine 4% Liquid 1 Application(s) Topical <User Schedule>  folic acid 1 milliGRAM(s) Oral daily  heparin  Injectable 5000 Unit(s) SubCutaneous every 8 hours  NIFEdipine XL 60 milliGRAM(s) Oral daily        VITALS:  T(F): 96.6 (19 @ 05:46), Max: 98.5 (19 @ 14:21)  HR: 98 (19 @ 05:46)  BP: 162/108 (19 @ 05:46)  RR: 18 (19 @ 05:46)  SpO2: --  Wt(kg): --     @ 07:01  -   @ 07:00  --------------------------------------------------------  IN: 0 mL / OUT: 2100 mL / NET: -2100 mL     @ 07:01  -   @ 07:00  --------------------------------------------------------  IN: 0 mL / OUT: 1875 mL / NET: -1875 mL          PHYSICAL EXAM:  	Gen: NAD  	Pulm: decrease BS  B/L  	CV: S1S2; no rub  	Abd: +distended  	LE: no edema    LABS:      139  |  103  |  29<H>  ----------------------------<  76  4.4   |  16<L>  |  2.1<H>    Ca    8.9      2019 07:17                            7.2    14.00 )-----------( 204      ( 2019 07:17 )             20.3       Urine Studies:  Urinalysis Basic - ( 2019 22:24 )    Color: Yellow / Appearance: Clear / S.015 / pH:   Gluc:  / Ketone: Negative  / Bili: Small / Urobili: 1.0 mg/dL   Blood:  / Protein: >=300 mg/dL / Nitrite: Negative   Leuk Esterase: Negative / RBC: 3-5 /HPF / WBC    Sq Epi:  / Non Sq Epi: Few /HPF / Bacteria: Few /HPF      Sodium, Random Urine: 52.0 mmoL/L ( @ 10:30)  Chloride, Random Urine: 44 ( @ 10:30)  Potassium, Random Urine: 31 mmol/L ( @ 10:30)  Creatinine, Random Urine: 81 mg/dL ( @ 10:30)    RADIOLOGY & ADDITIONAL STUDIES:

## 2019-02-22 NOTE — PROGRESS NOTE ADULT - ASSESSMENT
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

## 2019-02-22 NOTE — DISCHARGE NOTE ADULT - CARE PROVIDER_API CALL
Rand Saldaña)  HematologyOncology; Internal Medicine; Medical Oncology  [address] [place] [address]  Phone: [phone]  Fax: [phone]  Follow Up Time:     Ruperto Cortez (DO)  Medicine  Physicians  [address] [place] [address]  Phone: [phone]  Fax: [phone]  Follow Up Time:

## 2019-02-22 NOTE — PROGRESS NOTE ADULT - SUBJECTIVE AND OBJECTIVE BOX
PROGRESS NOTE  Chief Complaint:  Patient is a 38y old  Male who presents with a chief complaint of Sickle cell disease vasoocclusive crisis (22 Feb 2019 10:50)    ALLERGIES:  No Known Allergies      HOSPITAL MEDICATIONS:  MEDICATIONS  (STANDING):  azithromycin  IVPB      azithromycin  IVPB 500 milliGRAM(s) IV Intermittent every 24 hours  cefTRIAXone   IVPB 1 Gram(s) IV Intermittent every 24 hours  chlorhexidine 4% Liquid 1 Application(s) Topical <User Schedule>  folic acid 1 milliGRAM(s) Oral daily  heparin  Injectable 5000 Unit(s) SubCutaneous every 8 hours  NIFEdipine XL 60 milliGRAM(s) Oral daily    MEDICATIONS  (PRN):  acetaminophen   Tablet .. 650 milliGRAM(s) Oral every 6 hours PRN Mild Pain (1 - 3)  HYDROmorphone   Tablet 10 milliGRAM(s) Oral every 4 hours PRN Severe Pain (7 - 10)  ondansetron    Tablet 4 milliGRAM(s) Oral every 6 hours PRN Nausea and/or Vomiting      PMHX/PSHX:  Sickle cell anemia      REVIEW OF SYSTEMS:    General:  No wt loss, fevers, chills, night sweats, fatigue,   Eyes:  Good vision, no reported pain  ENT:  No sore throat, pain, runny nose, dysphagia  CV:  No pain, palpitations, hypo/hypertension  Resp:  No dyspnea, cough, tachypnea, wheezing  GI:  No pain, No nausea, No vomiting, No diarrhea, No constipation, No weight loss, No fever, No pruritis, No rectal bleeding, No tarry stools, No dysphagia,  :  No pain, bleeding, incontinence, nocturia  Muscle:  No pain, weakness  Neuro:  No weakness, tingling, memory problems  Psych:  No fatigue, insomnia, mood problems, depression  Endocrine:  No polyuria, polydipsia, cold/heat intolerance  Heme:  No petechiae, ecchymosis, easy bruisability  Skin:  No rash, tattoos, scars, edema      PHYSICAL EXAM:   Vital Signs:  Vital Signs Last 24 Hrs  T(C): 35.9 (22 Feb 2019 05:46), Max: 36.9 (21 Feb 2019 14:21)  T(F): 96.6 (22 Feb 2019 05:46), Max: 98.5 (21 Feb 2019 14:21)  HR: 98 (22 Feb 2019 05:46) (98 - 103)  BP: 162/108 (22 Feb 2019 05:46) (139/80 - 162/108)  BP(mean): --  RR: 18 (22 Feb 2019 05:46) (18 - 18)      GENERAL:  Appears stated age, well-groomed, well-nourished, no distress  HEENT:  NC/AT,  conjunctivae clear and pink, no thyromegaly, nodules, adenopathy, no JVD, sclera -anicteric  CHEST:  Full & symmetric excursion, no increased effort, breath sounds clear  HEART:  Regular rhythm, S1, S2, no murmur/rub/S3/S4, no abdominal bruit, no edema  ABDOMEN:  Soft, non-tender, non-distended, normoactive bowel sounds,  no masses ,no hepato-splenomegaly, no signs of chronic liver disease  EXTEREMITIES:  no cyanosis,clubbing or edema  SKIN:  No rash/erythema/ecchymoses/petechiae/wounds/abscess/warm/dry  NEURO:  Alert, oriented, no asterixis, no tremor, no encephalopathy    LABS:                        7.2    14.00 )-----------( 204      ( 22 Feb 2019 07:17 )             20.3     02-22    139  |  103  |  29<H>  ----------------------------<  76  4.4   |  16<L>  |  2.1<H>    Ca    8.9      22 Feb 2019 07:17      IMAGING:      ASSESSMENT & PLAN:

## 2019-02-22 NOTE — PROGRESS NOTE ADULT - PROVIDER SPECIALTY LIST ADULT
Heme/Onc
Internal Medicine
Nephrology
Internal Medicine
Heme/Onc

## 2019-02-22 NOTE — DISCHARGE NOTE ADULT - HOSPITAL COURSE
39 yo m with pmh of sickle cell disease, on hydroxyurea only, says baseline hgb of 7, presents with c/o nausea and "parasites" on skin and stool.  pt says he had been feeling very itchy all over for 2 weeks. In the ED the patient had a Hgb of 5.5, Retic 4.5, T&S obtained, and 2U PRBC was given     Active problems in this admission:     # Vasoocclusive sickle cell disease pain with Sickle cell crisis.   No acute chest syndrome . Ruled out  - hbg is stable on discharge 7.2  today   - s/p 2u prbc on 2/17, no more transfusion required  - O2 sat was 87 on the day of admission improved to 100 on room air after hydration.  - stable bilateral opacities on CXR,   - v/q scan ( was done to rule out pe ) is negative   - CE is negative and no acute ischemic changes in EKG  - received IVH for 5 days 150 cc/hour   - s/p 5 days of ceftriaxone and azithromycin, will switch to Augmentin and azithromycin on discharge   - s/p Hydromorphone 4mg IV q4, switched to hydromorphone 10 PO 2 days before discharge   - we continued folic acid supplement   - was on Incentive spirometry, and DVT ppx   - Hematology was following the case, will follow up as out patient in one week ( DR Peña )     # Elevated Cr, Likely KAMRAN on CKD 3, no documented previous Cr level   - Cr improving 3 > 2.7 > 2.6 > 2.1 on discharge   - proteuria, no urine RBC with positive dipstick due to hemolysis, CK WNL  - nephrology following, patient will follow up as outpatient     # HTN, will increase Nifedipine ER to 90 QD.  - needs to follow up with PMD for medication adjustment.      # Sensation of parasites, likely alcohol or substance withdrawal, resolved     # Folic acid deficiency -will continue supplement     stable for discharge

## 2019-02-22 NOTE — DISCHARGE NOTE ADULT - CARE PLAN
Principal Discharge DX:	Sickle cell anemia  Goal:	controlling symptoms and prevention of complications  Assessment and plan of treatment:	symptoms improved, please follow up with Dr Peña ( Hematologist ) in one week to consider starting new medications and repeat the blood count. please report to ED in case of pain, shortness of breath, headache or any new symptoms.   continue the antibiotics for 3 more days as prescribed.  Secondary Diagnosis:	Hypertension  Assessment and plan of treatment:	continue Nifedipine ER 90 daily  measure your blood pressure at home   follow up with your Primary doctor  Secondary Diagnosis:	KAMRAN (acute kidney injury)  Assessment and plan of treatment:	avoid dehydration, avoid nephrotoxins, maintain controlled blood pressure, please follow up with the kidney doctor in one week  Secondary Diagnosis:	CKD (chronic kidney disease)  Assessment and plan of treatment:	follow up with the kidney doctor in one week

## 2019-02-22 NOTE — DISCHARGE NOTE ADULT - PATIENT PORTAL LINK FT
You can access the [facility] Patient Portal, offered by [facility], by registering with the following website: [email or web address]

## 2019-02-22 NOTE — DISCHARGE NOTE ADULT - PLAN OF CARE
controlling symptoms and prevention of complications symptoms improved, please follow up with Dr Peña ( Hematologist ) in one week to consider starting new medications and repeat the blood count. please report to ED in case of pain, shortness of breath, headache or any new symptoms.   continue the antibiotics for 3 more days as prescribed. continue Nifedipine ER 90 daily  measure your blood pressure at home   follow up with your Primary doctor avoid dehydration, avoid nephrotoxins, maintain controlled blood pressure, please follow up with the kidney doctor in one week follow up with the kidney doctor in one week

## 2019-02-22 NOTE — PROGRESS NOTE ADULT - ATTENDING COMMENTS
D/C today. Meds sent to pharmacy  of Dilaudid
Expecting D/C tomorrow.   Patient is comfortable going home tomorrow.   Better pain controlled now.   Out patient Hem onc follow ups
Patient was seen independently. Latest vital signs and labs were reviewed. Case was discussed with resident . I  Agree with resident's subjective, physical exam findings and  assessment & plan which was reviewed by me and modified where necessary with following additions.     patient c/o persistent back pain in the morning, seemed agitated, mild distress and c/o chest tightness in the morning.     acute sickle cell crisis with possibility of  mild acute chest syndrome/ruled out Pulmonary Embolism/low suspicion for ACS:   hypoxia, PSo2 improved with supplemental O2  c/w empiric Antibiotics, rocephin /azithromycin  V/Q scan : negative   hem /onc consult appreciated- low suspicion for ACS, no need for exchange transfusion currently   monitor H/H daily- transfuse if patient symptomatic or Hb<7  c/w IVF hydration   change pain med to dilaudid IV with benadryl   trend retic ount, follow up LDH, hemoglobin electrophoresis    possible KAMRAN on CKD stage 3:  monitor cr daily   monitor I&O's  follow up renal   c/w IVF's    dispo: CM for placement , still acute.
Pt seen and examined independently. Was c/o pain today and requested change in dilaudid - frequency adjusted and monitor.   Hgb dropped today - monitor for now.   Continue IVF, folic acid, pain control for sickle cell pain crisis.  Seen by Heme Onc  ?acute chest syndrome but improving now - pt off oxygen and denies SOB.   Neuro recommends ACE-I for HTN control.  f/u workup for KAMRAN vs. CKD (more likely CKD per nephro).    I discussed the case with the resident and I reviewed his note. Agree with the history, physical exam, assessment and plan as outlined with additions as above.
Appears in no distress at all today, watching ipad, sitting upright, on NC.   Lungs are clear on exam. Reports pain is well controlled today.   Will follow.

## 2019-02-22 NOTE — CHART NOTE - NSCHARTNOTEFT_GEN_A_CORE
<<<RESIDENT DISCHARGE NOTE>>>     KARLA MONROY  [id number]    VITAL SIGNS:  T(F): 96.6 (02-22-19 @ 05:46), Max: 98.5 (02-21-19 @ 14:21)  HR: 98 (02-22-19 @ 05:46)  BP: 162/108 (02-22-19 @ 05:46)  SpO2: --      PHYSICAL EXAMINATION:  General: well developed  Head & Neck: atraumatic normocephalic  Pulmonary: normal air entry bilateral  Cardiovascular: normal s1  Gastrointestinal/Abdomens2 & Pelvis: soft nontender  Neurologic/Motor: nonfocal     TEST RESULTS:                        7.2    14.00 )-----------( 204      ( 22 Feb 2019 07:17 )             20.3       02-22    139  |  103  |  29<H>  ----------------------------<  76  4.4   |  16<L>  |  2.1<H>    Ca    8.9      22 Feb 2019 07:17    stable for discharge  pain controlled   denies SOB, O2 sat 100 on room air.     FINAL DISCHARGE INTERVIEW:  Resident(s) Present: chayo becerril md    DISCHARGE MEDICATION RECONCILIATION  reviewed with Attending DR FERNANDEZ    DISPOSITION:   Shelter

## 2019-02-22 NOTE — DISCHARGE NOTE ADULT - MEDICATION SUMMARY - MEDICATIONS TO TAKE
I will START or STAY ON the medications listed below when I get home from the hospital:    Benadryl 25 mg oral capsule  -- 1 cap(s) by mouth every 6 hours   -- May cause drowsiness.  Alcohol may intensify this effect.  Use care when operating dangerous machinery.  Obtain medical advice before taking any non-prescription drugs as some may affect the action of this medication.    -- Indication: For itching     NIFEdipine 90 mg oral tablet, extended release  -- 1 tab(s) by mouth once a day   -- Avoid grapefruit and grapefruit juice while taking this medication.  It is very important that you take or use this exactly as directed.  Do not skip doses or discontinue unless directed by your doctor.  Some non-prescription drugs may aggravate your condition.  Read all labels carefully.  If a warning appears, check with your doctor before taking.  Swallow whole.  Do not crush.    -- Indication: For Hypertension     azithromycin 250 mg oral tablet  -- 1 tab(s) by mouth once a day   -- Do not take dairy products, antacids, or iron preparations within one hour of this medication.  Finish all this medication unless otherwise directed by prescriber.    -- Indication: For pneumonia?    Augmentin 875 mg-125 mg oral tablet  -- 1 tab(s) by mouth 2 times a day   -- Finish all this medication unless otherwise directed by prescriber.  Take with food or milk.    -- Indication: For pneumonia?    folic acid 1 mg oral tablet  -- 1 tab(s) by mouth once a day  -- Indication: For folic acid

## 2019-02-24 LAB
CULTURE RESULTS: SIGNIFICANT CHANGE UP
SPECIMEN SOURCE: SIGNIFICANT CHANGE UP

## 2019-02-26 DIAGNOSIS — E87.5 HYPERKALEMIA: ICD-10-CM

## 2019-02-26 DIAGNOSIS — E87.2 ACIDOSIS: ICD-10-CM

## 2019-02-26 DIAGNOSIS — N17.9 ACUTE KIDNEY FAILURE, UNSPECIFIED: ICD-10-CM

## 2019-02-26 DIAGNOSIS — N18.3 CHRONIC KIDNEY DISEASE, STAGE 3 (MODERATE): ICD-10-CM

## 2019-02-26 DIAGNOSIS — D57.01 HB-SS DISEASE WITH ACUTE CHEST SYNDROME: ICD-10-CM

## 2019-02-26 DIAGNOSIS — I12.9 HYPERTENSIVE CHRONIC KIDNEY DISEASE WITH STAGE 1 THROUGH STAGE 4 CHRONIC KIDNEY DISEASE, OR UNSPECIFIED CHRONIC KIDNEY DISEASE: ICD-10-CM

## 2019-02-26 DIAGNOSIS — E53.8 DEFICIENCY OF OTHER SPECIFIED B GROUP VITAMINS: ICD-10-CM

## 2019-02-26 LAB — ANA TITR SER: NEGATIVE — SIGNIFICANT CHANGE UP

## 2019-02-28 ENCOUNTER — APPOINTMENT (OUTPATIENT)
Dept: HEMATOLOGY ONCOLOGY | Facility: CLINIC | Age: 39
End: 2019-02-28

## 2024-06-20 NOTE — CONSULT NOTE ADULT - ATTENDING COMMENTS
RE: Plan of Care    Tremayne Urena MD    Thank you for referring Christian Jordan. The following information reflects my assessment and plan of care.    Please review and sign the attached form to indicate your approval of the plan of care. Insurance compliance requires your approval be on this plan of care. After your review, please fax back all pages received. Should you have any questions, feel free to contact me.    Galilea Blake, SLP  [facility]  [address]  [place] [address]  Phone: [phone]  Fax: [phone]           Plan of Care 24   Effective from: 2024  Effective to: 2024    Plan ID: [id number]            Participants as of Finalize on 2024    Name Type Comments Contact Info    Tremayne Urena MD Referring Provider  [phone]    Galilea Blake, SLP Speech Language Pathologist             Christian Jordan MRN:[id number] (:1945 79 year old M)             Evaluation     Author: Galilea Blake, SLP Status: Addendum Last edited: 2024  4:00 PM       Speech-Language Pathology Evaluation    Visit Type: Initial Evaluation  Visit: 1  Referring Provider: Tremayne Urena MD  Medical Diagnosis (from order): Diagnosis Information      Diagnosis    R13.10 (ICD-10-CM) - Dysphagia, unspecified type            Medical Diagnosis (from order): CVA  Treatment diagnosis: Cognitive Linguistic Deficits Following Cerebral Infarction  Date of onset: 2024  Chart reviewed at time of initial evaluation (relevant co-morbidities, allergies, tests and medications listed): PMHx:  hypertension, CKD stage IIIb, Bell's palsy    Allergies: NKA    Per chart review, pt was receiving speech language therapy in the acute rehabilitation setting.     2024 MRI RESULTS:   1. There is an evolving infarct along the right MCA vascular territory with hemosiderin   staining at the lentiform nucleus corresponding to the subtle hyperdensity on the earlier    CT study from today. No additional acute ischemia, with chronic infarcts, as above.     2. Bilateral parotid masses may represent intraparotid lymph nodes, with other   possibilities considered given the patient's age. This finding is nonemergent and   outpatient follow-up is suggested.     Diagnostic tests reviewed: MRI studies    SUBJECTIVE                                                                                                             Pt pleasant and cooperative. Daughter present with pt permission. Pt reports no further cognitive linguistic deficits and feels that he is at his baseline. Daughter was in agreement.   Pain / Symptoms:  - patient reports pain is not an issue/concern  Function:  - Current functional limitations / exacerbation factors: difficulty (pt does not report difficulty) with  - Prior level of function: no concerns with communication and no concerns with cognition.  Patient/Caregiver Goals: To be assessed     Prior treatment: inpatient PT, inpatient speech, inpatient OT, outpatient OT, outpatient PT  Discharged from hospital, home health, or skilled nursing facility in last 30 days: no  Home Environment:   Patient lives with: adult children  Assistance available: as needed  Feel safe at home/work/school: Feels safe at home/work/school.  Denies 2 or more falls or an unexplained fall with injury in the last year.    OBJECTIVE                                                                                                                           Oral Mechanism   Oral Motor Assessment: intact    Diet prior to visit: Liquid- Thin and IDDSI 7 Easy-to-Chew    Swallow  Pt reports no difficulties with current diet. Swallow was assessed when pt was hospitalized.     Motor Speech    - Articulation: intact (pt reports he is at his speech baseline)           Outcome/Assessments  Cognitive Linguistic Quick Test (CLQT) Plus  The CLQT+ is a standardized, criterion-referenced assessment that is  designed to gain information about cognitive-linguistic functioning for acquired neurological dysfunction in individuals ages 18-89.  The CLQT+assists in determining the relative status of five primary cognitive domains important to basic and higher-level ADL's: attention, executive functions, memory, language, and visuospatial skills.  Cognitive Domain Score Severity Rating  Attention 58/215 Moderate  Memory 132/185 Mild  Executive Functions  4/40 Severe  Language 24/37 Moderate  Visuospatial Skills 31/105 Moderate  Composite Severity Rating  Moderate  Clock Drawing 11/13 WNL  WNL=within normal limits           ASSESSMENT                                                                                                           79 year old male patient has signs and symptoms consistent with Cognitive Linguistic Deficits Following Cerebral Infarction based on this evaluation current performance is below functional baseline.  Pt presents with an overall moderate cognitive linguistic deficit. Specifically, mild memory deficits, moderate attention, language, and visuospatial deficits. Severe executive functioning deficits as seen on his formal evaluation.    This was pulled from previous speech notes when reviewing the medical chart \" Per pt and family, the patient had some level of difficulty with cognitive-linguistic tasks at baseline; difficulty with spelling simple words, with reading comprehension, auditory comprehension. The patient has an 8th grade highest education level. He states school was always challenging for him. \"    However, pt and his daughter report that he would have performed \"the same way\" prior to the CVA. Pt is able to manage meds/bills under supervision of his family. Pt is communicating without difficulty to family and friends. Family support is provided \"most of the time.\" Discussed importance of supervision for higher level tasks and to continue with doctor recommendations of no driving. At  this time due to pt and his daughter reporting he is at his cognitive linguistic baseline, skilled speech therapy services are not recommended. Pt and his daughter were in agreement.      Prognosis: patient will not benefit from skilled therapy  Education:   - Present and ready to learn: patient and patient's family  - Results of above outlined education: Verbalizes understanding and Demonstrates understanding    PLAN                                                                                                                         The following skilled interventions to be implemented to achieve goals listed below:  Speech & Language Comprehension & Express Eval (14890)    Frequency / Duration: 1 times per week tapering as patient progresses for an estimated total of 1 visits for 1 weeks  Patient and patient's family involved in and agreed to plan of care and goals.    GOALS                                                                                                                           Short Term Goals:   N/A  Long Term Goals: to be met by end of plan of care  1. N/A      Therapy procedure time and total treatment time can be found documented on the Time Entry flowsheet         Current Participants as of 6/20/2024    Name Type Comments Contact Info    Tremayne Urena MD Referring Provider  [phone]    Signature pending    Galilea Blake SLP Speech Language Pathologist      Electronically signed by ESSIE Andre at 6/20/2024 1700 CDT          RE: Plan of Care for Christian Jordan, YOB: 1945     I certify the need for these services, furnished under this plan of treatment and while under my care.  I agree with the plan of care as stated and request that therapy proceed.        __________________________________________________________________________________  Provider Signature         Date    When evaluated today at around 2:30PM, patient is alert and oriented, he reports mild chest tightness, but no chest pain, he reports pain in his abdomen and legs consistent with crisis, his labs are supportive of crisis.  He reports that he last received Hydrea few weeks ago, he was prescribed Hydrea in the hospital and does not have a hematologist.  He describes with crisis as typical and reports that he has never had a red blood cell exchange in the past, he recalls one remove admission to ICU with breathing complaints years ago. He is not interested in receiving PRBC exchange at this time.  His chest XRAY was personally reviewed by myself, infiltrates appear to be worse than yesterday.  Recommend consideration on 1-2 units of PRBC transfusion. Evaluation for higher level of care upgrade. ABG check.  Patient is low threshold for PRBC exchange, if clinical condition deteriorates, of ABG is suggestive of hypoxia.   Will hold off for now 2/2 multiple antibodies. Case was discussed with blood bank.   Continue with pain management. Will follow closely.